# Patient Record
Sex: FEMALE | Race: WHITE | NOT HISPANIC OR LATINO | Employment: OTHER | URBAN - METROPOLITAN AREA
[De-identification: names, ages, dates, MRNs, and addresses within clinical notes are randomized per-mention and may not be internally consistent; named-entity substitution may affect disease eponyms.]

---

## 2017-05-21 ENCOUNTER — HOSPITAL ENCOUNTER (INPATIENT)
Facility: HOSPITAL | Age: 82
LOS: 5 days | Discharge: HOSPICE | DRG: 023 | End: 2017-05-26
Attending: EMERGENCY MEDICINE | Admitting: PSYCHIATRY & NEUROLOGY
Payer: MEDICARE

## 2017-05-21 DIAGNOSIS — I63.511 ACUTE ISCHEMIC RIGHT MCA STROKE: ICD-10-CM

## 2017-05-21 DIAGNOSIS — F41.9 ANXIETY: ICD-10-CM

## 2017-05-21 DIAGNOSIS — R52 PAIN: ICD-10-CM

## 2017-05-21 DIAGNOSIS — Z51.5 PALLIATIVE CARE ENCOUNTER: ICD-10-CM

## 2017-05-21 DIAGNOSIS — Z71.89 GOALS OF CARE, COUNSELING/DISCUSSION: ICD-10-CM

## 2017-05-21 DIAGNOSIS — I63.231 CEREBROVASCULAR ACCIDENT (CVA) DUE TO OCCLUSION OF RIGHT CAROTID ARTERY: ICD-10-CM

## 2017-05-21 PROBLEM — R29.810 WEAKNESS ON LEFT SIDE OF FACE: Status: RESOLVED | Noted: 2017-05-21 | Resolved: 2017-05-21

## 2017-05-21 PROBLEM — G81.04 FLACCID HEMIPLEGIA AFFECTING LEFT NONDOMINANT SIDE: Status: ACTIVE | Noted: 2017-05-21

## 2017-05-21 PROBLEM — Z92.82 RECEIVED TISSUE PLASMINOGEN ACTIVATOR (T-PA) LESS THAN 24 HOURS PRIOR TO ARRIVAL: Status: ACTIVE | Noted: 2017-05-21

## 2017-05-21 PROBLEM — R47.1 DYSARTHRIA: Status: ACTIVE | Noted: 2017-05-21

## 2017-05-21 PROBLEM — G93.6 CYTOTOXIC CEREBRAL EDEMA: Status: ACTIVE | Noted: 2017-05-21

## 2017-05-21 PROBLEM — I63.411 EMBOLIC STROKE INVOLVING RIGHT MIDDLE CEREBRAL ARTERY: Status: ACTIVE | Noted: 2017-05-21

## 2017-05-21 PROBLEM — R29.810 WEAKNESS ON LEFT SIDE OF FACE: Status: ACTIVE | Noted: 2017-05-21

## 2017-05-21 PROBLEM — H53.462 LEFT HOMONYMOUS HEMIANOPSIA: Status: ACTIVE | Noted: 2017-05-21

## 2017-05-21 LAB
ABO + RH BLD: NORMAL
ALBUMIN SERPL BCP-MCNC: 3.9 G/DL
ALLENS TEST: ABNORMAL
ALP SERPL-CCNC: 69 U/L
ALT SERPL W/O P-5'-P-CCNC: 26 U/L
ANION GAP SERPL CALC-SCNC: 11 MMOL/L
ANISOCYTOSIS BLD QL SMEAR: ABNORMAL
ANISOCYTOSIS BLD QL SMEAR: ABNORMAL
APTT BLDCRRT: 21.5 SEC
APTT BLDCRRT: <21 SEC
AST SERPL-CCNC: 42 U/L
BACTERIA #/AREA URNS AUTO: ABNORMAL /HPF
BASO STIPL BLD QL SMEAR: ABNORMAL
BASOPHILS # BLD AUTO: 0.01 K/UL
BASOPHILS # BLD AUTO: 0.02 K/UL
BASOPHILS NFR BLD: 0.2 %
BASOPHILS NFR BLD: 0.3 %
BILIRUB SERPL-MCNC: 0.9 MG/DL
BILIRUB UR QL STRIP: NEGATIVE
BLD GP AB SCN CELLS X3 SERPL QL: NORMAL
BUN SERPL-MCNC: 15 MG/DL
CALCIUM SERPL-MCNC: 8.7 MG/DL
CHLORIDE SERPL-SCNC: 109 MMOL/L
CHOLEST/HDLC SERPL: 3.3 {RATIO}
CK SERPL-CCNC: 538 U/L
CLARITY UR REFRACT.AUTO: CLEAR
CO2 SERPL-SCNC: 20 MMOL/L
COLOR UR AUTO: YELLOW
CREAT SERPL-MCNC: 1 MG/DL
DACRYOCYTES BLD QL SMEAR: ABNORMAL
DELSYS: ABNORMAL
DIFFERENTIAL METHOD: ABNORMAL
DIFFERENTIAL METHOD: ABNORMAL
EOSINOPHIL # BLD AUTO: 0 K/UL
EOSINOPHIL # BLD AUTO: 0 K/UL
EOSINOPHIL NFR BLD: 0 %
EOSINOPHIL NFR BLD: 0 %
ERYTHROCYTE [DISTWIDTH] IN BLOOD BY AUTOMATED COUNT: 21.2 %
ERYTHROCYTE [DISTWIDTH] IN BLOOD BY AUTOMATED COUNT: 21.6 %
ERYTHROCYTE [SEDIMENTATION RATE] IN BLOOD BY WESTERGREN METHOD: 16 MM/H
EST. GFR  (AFRICAN AMERICAN): >60 ML/MIN/1.73 M^2
EST. GFR  (NON AFRICAN AMERICAN): 52.6 ML/MIN/1.73 M^2
FIO2: 21
GIANT PLATELETS BLD QL SMEAR: PRESENT
GLUCOSE SERPL-MCNC: 137 MG/DL
GLUCOSE UR QL STRIP: ABNORMAL
HCO3 UR-SCNC: 18.3 MMOL/L (ref 24–28)
HCT VFR BLD AUTO: 25.5 %
HCT VFR BLD AUTO: 25.5 %
HCT VFR BLD AUTO: 26.3 %
HCT VFR BLD AUTO: 30.8 %
HDL/CHOLESTEROL RATIO: 29.9 %
HDLC SERPL-MCNC: 187 MG/DL
HDLC SERPL-MCNC: 56 MG/DL
HGB BLD-MCNC: 7.8 G/DL
HGB BLD-MCNC: 7.8 G/DL
HGB BLD-MCNC: 7.9 G/DL
HGB BLD-MCNC: 9.3 G/DL
HGB UR QL STRIP: ABNORMAL
HYPOCHROMIA BLD QL SMEAR: ABNORMAL
HYPOCHROMIA BLD QL SMEAR: ABNORMAL
INR PPP: 1
INR PPP: 1.1
KETONES UR QL STRIP: ABNORMAL
LDLC SERPL CALC-MCNC: 117 MG/DL
LEUKOCYTE ESTERASE UR QL STRIP: NEGATIVE
LYMPHOCYTES # BLD AUTO: 0.4 K/UL
LYMPHOCYTES # BLD AUTO: 0.5 K/UL
LYMPHOCYTES NFR BLD: 6.3 %
LYMPHOCYTES NFR BLD: 8.1 %
MAGNESIUM SERPL-MCNC: 2 MG/DL
MCH RBC QN AUTO: 22.5 PG
MCH RBC QN AUTO: 22.7 PG
MCHC RBC AUTO-ENTMCNC: 30.2 %
MCHC RBC AUTO-ENTMCNC: 30.6 %
MCV RBC AUTO: 74 FL
MCV RBC AUTO: 75 FL
MICROSCOPIC COMMENT: ABNORMAL
MODE: ABNORMAL
MONOCYTES # BLD AUTO: 0.3 K/UL
MONOCYTES # BLD AUTO: 0.9 K/UL
MONOCYTES NFR BLD: 13.1 %
MONOCYTES NFR BLD: 5.5 %
NEUTROPHILS # BLD AUTO: 5.1 K/UL
NEUTROPHILS # BLD AUTO: 5.3 K/UL
NEUTROPHILS NFR BLD: 80.3 %
NEUTROPHILS NFR BLD: 86.2 %
NITRITE UR QL STRIP: NEGATIVE
NONHDLC SERPL-MCNC: 131 MG/DL
OVALOCYTES BLD QL SMEAR: ABNORMAL
OVALOCYTES BLD QL SMEAR: ABNORMAL
PCO2 BLDA: 27.2 MMHG (ref 35–45)
PH SMN: 7.44 [PH] (ref 7.35–7.45)
PH UR STRIP: 5 [PH] (ref 5–8)
PHOSPHATE SERPL-MCNC: 3.3 MG/DL
PLATELET # BLD AUTO: 201 K/UL
PLATELET # BLD AUTO: 248 K/UL
PLATELET BLD QL SMEAR: ABNORMAL
PLATELET BLD QL SMEAR: ABNORMAL
PMV BLD AUTO: 10.2 FL
PMV BLD AUTO: 10.7 FL
PO2 BLDA: 75 MMHG (ref 80–100)
POC BE: -6 MMOL/L
POC SATURATED O2: 96 % (ref 95–100)
POC TCO2: 19 MMOL/L (ref 23–27)
POIKILOCYTOSIS BLD QL SMEAR: SLIGHT
POIKILOCYTOSIS BLD QL SMEAR: SLIGHT
POLYCHROMASIA BLD QL SMEAR: ABNORMAL
POTASSIUM SERPL-SCNC: 3.7 MMOL/L
PROT SERPL-MCNC: 6.7 G/DL
PROT UR QL STRIP: NEGATIVE
PROTHROMBIN TIME: 11.1 SEC
PROTHROMBIN TIME: 11.4 SEC
RBC # BLD AUTO: 3.43 M/UL
RBC # BLD AUTO: 4.13 M/UL
RBC #/AREA URNS AUTO: >100 /HPF (ref 0–4)
SAMPLE: ABNORMAL
SITE: ABNORMAL
SODIUM SERPL-SCNC: 140 MMOL/L
SP GR UR STRIP: >1.03 (ref 1–1.03)
SP02: 97
SQUAMOUS #/AREA URNS AUTO: 1 /HPF
TRIGL SERPL-MCNC: 70 MG/DL
TROPONIN I SERPL DL<=0.01 NG/ML-MCNC: 0.71 NG/ML
TROPONIN I SERPL DL<=0.01 NG/ML-MCNC: 2.77 NG/ML
TSH SERPL DL<=0.005 MIU/L-ACNC: 1.41 UIU/ML
URN SPEC COLLECT METH UR: ABNORMAL
UROBILINOGEN UR STRIP-ACNC: NEGATIVE EU/DL
WBC # BLD AUTO: 5.96 K/UL
WBC # BLD AUTO: 6.62 K/UL

## 2017-05-21 PROCEDURE — 85018 HEMOGLOBIN: CPT

## 2017-05-21 PROCEDURE — 99285 EMERGENCY DEPT VISIT HI MDM: CPT | Mod: ,,, | Performed by: EMERGENCY MEDICINE

## 2017-05-21 PROCEDURE — 86900 BLOOD TYPING SEROLOGIC ABO: CPT

## 2017-05-21 PROCEDURE — 85730 THROMBOPLASTIN TIME PARTIAL: CPT | Mod: 91

## 2017-05-21 PROCEDURE — 25000003 PHARM REV CODE 250: Performed by: STUDENT IN AN ORGANIZED HEALTH CARE EDUCATION/TRAINING PROGRAM

## 2017-05-21 PROCEDURE — 25000003 PHARM REV CODE 250: Performed by: PHYSICIAN ASSISTANT

## 2017-05-21 PROCEDURE — 82803 BLOOD GASES ANY COMBINATION: CPT

## 2017-05-21 PROCEDURE — 99291 CRITICAL CARE FIRST HOUR: CPT | Mod: 25,,, | Performed by: PSYCHIATRY & NEUROLOGY

## 2017-05-21 PROCEDURE — 25000003 PHARM REV CODE 250: Performed by: NURSE PRACTITIONER

## 2017-05-21 PROCEDURE — 63600175 PHARM REV CODE 636 W HCPCS: Performed by: RADIOLOGY

## 2017-05-21 PROCEDURE — 96374 THER/PROPH/DIAG INJ IV PUSH: CPT

## 2017-05-21 PROCEDURE — 80061 LIPID PANEL: CPT

## 2017-05-21 PROCEDURE — 84443 ASSAY THYROID STIM HORMONE: CPT

## 2017-05-21 PROCEDURE — 25000003 PHARM REV CODE 250

## 2017-05-21 PROCEDURE — 85014 HEMATOCRIT: CPT

## 2017-05-21 PROCEDURE — 25000003 PHARM REV CODE 250: Performed by: PSYCHIATRY & NEUROLOGY

## 2017-05-21 PROCEDURE — 80053 COMPREHEN METABOLIC PANEL: CPT

## 2017-05-21 PROCEDURE — 84484 ASSAY OF TROPONIN QUANT: CPT

## 2017-05-21 PROCEDURE — 25500020 PHARM REV CODE 255: Performed by: EMERGENCY MEDICINE

## 2017-05-21 PROCEDURE — 99223 1ST HOSP IP/OBS HIGH 75: CPT | Mod: GC,,, | Performed by: NEUROLOGICAL SURGERY

## 2017-05-21 PROCEDURE — 86850 RBC ANTIBODY SCREEN: CPT

## 2017-05-21 PROCEDURE — 81001 URINALYSIS AUTO W/SCOPE: CPT

## 2017-05-21 PROCEDURE — 83036 HEMOGLOBIN GLYCOSYLATED A1C: CPT

## 2017-05-21 PROCEDURE — 20000000 HC ICU ROOM

## 2017-05-21 PROCEDURE — B3161ZZ FLUOROSCOPY OF RIGHT INTERNAL CAROTID ARTERY USING LOW OSMOLAR CONTRAST: ICD-10-PCS | Performed by: RADIOLOGY

## 2017-05-21 PROCEDURE — 84484 ASSAY OF TROPONIN QUANT: CPT | Mod: 91

## 2017-05-21 PROCEDURE — 99223 1ST HOSP IP/OBS HIGH 75: CPT | Mod: ,,, | Performed by: PHYSICIAN ASSISTANT

## 2017-05-21 PROCEDURE — 85610 PROTHROMBIN TIME: CPT | Mod: 91

## 2017-05-21 PROCEDURE — 85610 PROTHROMBIN TIME: CPT

## 2017-05-21 PROCEDURE — 85730 THROMBOPLASTIN TIME PARTIAL: CPT

## 2017-05-21 PROCEDURE — 93005 ELECTROCARDIOGRAM TRACING: CPT

## 2017-05-21 PROCEDURE — 85384 FIBRINOGEN ACTIVITY: CPT

## 2017-05-21 PROCEDURE — 36600 WITHDRAWAL OF ARTERIAL BLOOD: CPT

## 2017-05-21 PROCEDURE — 85025 COMPLETE CBC W/AUTO DIFF WBC: CPT

## 2017-05-21 PROCEDURE — 96376 TX/PRO/DX INJ SAME DRUG ADON: CPT

## 2017-05-21 PROCEDURE — 36620 INSERTION CATHETER ARTERY: CPT | Mod: ,,, | Performed by: PHYSICIAN ASSISTANT

## 2017-05-21 PROCEDURE — 99900035 HC TECH TIME PER 15 MIN (STAT)

## 2017-05-21 PROCEDURE — 82550 ASSAY OF CK (CPK): CPT

## 2017-05-21 PROCEDURE — 93010 ELECTROCARDIOGRAM REPORT: CPT | Mod: ,,, | Performed by: INTERNAL MEDICINE

## 2017-05-21 PROCEDURE — 83735 ASSAY OF MAGNESIUM: CPT

## 2017-05-21 PROCEDURE — 03CK3ZZ EXTIRPATION OF MATTER FROM RIGHT INTERNAL CAROTID ARTERY, PERCUTANEOUS APPROACH: ICD-10-PCS | Performed by: RADIOLOGY

## 2017-05-21 PROCEDURE — 63600175 PHARM REV CODE 636 W HCPCS: Performed by: STUDENT IN AN ORGANIZED HEALTH CARE EDUCATION/TRAINING PROGRAM

## 2017-05-21 PROCEDURE — 86920 COMPATIBILITY TEST SPIN: CPT

## 2017-05-21 PROCEDURE — B3131ZZ FLUOROSCOPY OF RIGHT COMMON CAROTID ARTERY USING LOW OSMOLAR CONTRAST: ICD-10-PCS | Performed by: RADIOLOGY

## 2017-05-21 PROCEDURE — 80307 DRUG TEST PRSMV CHEM ANLYZR: CPT

## 2017-05-21 PROCEDURE — 25000003 PHARM REV CODE 250: Performed by: RADIOLOGY

## 2017-05-21 PROCEDURE — 84100 ASSAY OF PHOSPHORUS: CPT

## 2017-05-21 PROCEDURE — 99285 EMERGENCY DEPT VISIT HI MDM: CPT | Mod: 25

## 2017-05-21 RX ORDER — FENTANYL CITRATE 50 UG/ML
INJECTION, SOLUTION INTRAMUSCULAR; INTRAVENOUS CODE/TRAUMA/SEDATION MEDICATION
Status: COMPLETED | OUTPATIENT
Start: 2017-05-21 | End: 2017-05-21

## 2017-05-21 RX ORDER — POTASSIUM CHLORIDE 7.45 MG/ML
10 INJECTION INTRAVENOUS
Status: DISCONTINUED | OUTPATIENT
Start: 2017-05-21 | End: 2017-05-25

## 2017-05-21 RX ORDER — AMOXICILLIN 250 MG
1 CAPSULE ORAL 2 TIMES DAILY
Status: DISCONTINUED | OUTPATIENT
Start: 2017-05-21 | End: 2017-05-22

## 2017-05-21 RX ORDER — IODIXANOL 320 MG/ML
200 INJECTION, SOLUTION INTRAVASCULAR
Status: COMPLETED | OUTPATIENT
Start: 2017-05-21 | End: 2017-05-21

## 2017-05-21 RX ORDER — LIDOCAINE HYDROCHLORIDE 10 MG/ML
INJECTION INFILTRATION; PERINEURAL
Status: COMPLETED
Start: 2017-05-21 | End: 2017-05-21

## 2017-05-21 RX ORDER — GLUCAGON 1 MG
1 KIT INJECTION
Status: DISCONTINUED | OUTPATIENT
Start: 2017-05-21 | End: 2017-05-25

## 2017-05-21 RX ORDER — SODIUM CHLORIDE 0.9 % (FLUSH) 0.9 %
3 SYRINGE (ML) INJECTION EVERY 8 HOURS
Status: DISCONTINUED | OUTPATIENT
Start: 2017-05-21 | End: 2017-05-25

## 2017-05-21 RX ORDER — HYDRALAZINE HYDROCHLORIDE 20 MG/ML
10 INJECTION INTRAMUSCULAR; INTRAVENOUS EVERY 4 HOURS PRN
Status: DISCONTINUED | OUTPATIENT
Start: 2017-05-21 | End: 2017-05-24

## 2017-05-21 RX ORDER — ATORVASTATIN CALCIUM 20 MG/1
40 TABLET, FILM COATED ORAL DAILY
Status: DISCONTINUED | OUTPATIENT
Start: 2017-05-22 | End: 2017-05-22

## 2017-05-21 RX ORDER — INSULIN ASPART 100 [IU]/ML
0-5 INJECTION, SOLUTION INTRAVENOUS; SUBCUTANEOUS EVERY 6 HOURS PRN
Status: DISCONTINUED | OUTPATIENT
Start: 2017-05-21 | End: 2017-05-25

## 2017-05-21 RX ORDER — ONDANSETRON 2 MG/ML
4 INJECTION INTRAMUSCULAR; INTRAVENOUS EVERY 12 HOURS PRN
Status: DISCONTINUED | OUTPATIENT
Start: 2017-05-21 | End: 2017-05-26 | Stop reason: HOSPADM

## 2017-05-21 RX ORDER — MIDAZOLAM HYDROCHLORIDE 1 MG/ML
INJECTION, SOLUTION INTRAMUSCULAR; INTRAVENOUS CODE/TRAUMA/SEDATION MEDICATION
Status: COMPLETED | OUTPATIENT
Start: 2017-05-21 | End: 2017-05-21

## 2017-05-21 RX ORDER — ACETAMINOPHEN 325 MG/1
650 TABLET ORAL EVERY 6 HOURS PRN
Status: DISCONTINUED | OUTPATIENT
Start: 2017-05-21 | End: 2017-05-22

## 2017-05-21 RX ORDER — LIDOCAINE HYDROCHLORIDE 10 MG/ML
1 INJECTION, SOLUTION EPIDURAL; INFILTRATION; INTRACAUDAL; PERINEURAL ONCE
Status: COMPLETED | OUTPATIENT
Start: 2017-05-21 | End: 2017-05-21

## 2017-05-21 RX ORDER — MAGNESIUM SULFATE HEPTAHYDRATE 40 MG/ML
2 INJECTION, SOLUTION INTRAVENOUS
Status: DISCONTINUED | OUTPATIENT
Start: 2017-05-21 | End: 2017-05-25

## 2017-05-21 RX ORDER — MAGNESIUM SULFATE HEPTAHYDRATE 40 MG/ML
4 INJECTION, SOLUTION INTRAVENOUS
Status: DISCONTINUED | OUTPATIENT
Start: 2017-05-21 | End: 2017-05-25

## 2017-05-21 RX ORDER — PANTOPRAZOLE SODIUM 40 MG/1
40 TABLET, DELAYED RELEASE ORAL DAILY
COMMUNITY

## 2017-05-21 RX ORDER — SODIUM CHLORIDE 9 MG/ML
INJECTION, SOLUTION INTRAVENOUS CONTINUOUS
Status: DISCONTINUED | OUTPATIENT
Start: 2017-05-21 | End: 2017-05-25

## 2017-05-21 RX ORDER — NICARDIPINE HYDROCHLORIDE 0.2 MG/ML
5 INJECTION INTRAVENOUS CONTINUOUS
Status: DISCONTINUED | OUTPATIENT
Start: 2017-05-21 | End: 2017-05-22

## 2017-05-21 RX ADMIN — SODIUM CHLORIDE: 0.9 INJECTION, SOLUTION INTRAVENOUS at 11:05

## 2017-05-21 RX ADMIN — SODIUM CHLORIDE, PRESERVATIVE FREE 3 ML: 5 INJECTION INTRAVENOUS at 11:05

## 2017-05-21 RX ADMIN — LIDOCAINE HYDROCHLORIDE 10 MG: 10 INJECTION, SOLUTION EPIDURAL; INFILTRATION; INTRACAUDAL; PERINEURAL at 10:05

## 2017-05-21 RX ADMIN — MIDAZOLAM HYDROCHLORIDE 1 MG: 1 INJECTION, SOLUTION INTRAMUSCULAR; INTRAVENOUS at 05:05

## 2017-05-21 RX ADMIN — NICARDIPINE HYDROCHLORIDE 5 MG/HR: 0.2 INJECTION, SOLUTION INTRAVENOUS at 06:05

## 2017-05-21 RX ADMIN — FENTANYL CITRATE 25 MCG: 50 INJECTION, SOLUTION INTRAMUSCULAR; INTRAVENOUS at 04:05

## 2017-05-21 RX ADMIN — LIDOCAINE HYDROCHLORIDE: 10 INJECTION, SOLUTION INFILTRATION; PERINEURAL at 10:05

## 2017-05-21 RX ADMIN — IODIXANOL 80 ML: 320 INJECTION, SOLUTION INTRAVASCULAR at 05:05

## 2017-05-21 RX ADMIN — MIDAZOLAM HYDROCHLORIDE 1 MG: 1 INJECTION, SOLUTION INTRAMUSCULAR; INTRAVENOUS at 04:05

## 2017-05-21 RX ADMIN — FENTANYL CITRATE 25 MCG: 50 INJECTION, SOLUTION INTRAMUSCULAR; INTRAVENOUS at 05:05

## 2017-05-21 RX ADMIN — HYDRALAZINE HYDROCHLORIDE 10 MG: 20 INJECTION INTRAMUSCULAR; INTRAVENOUS at 09:05

## 2017-05-21 RX ADMIN — MIDAZOLAM HYDROCHLORIDE 0.5 MG: 1 INJECTION, SOLUTION INTRAMUSCULAR; INTRAVENOUS at 04:05

## 2017-05-21 RX ADMIN — MIDAZOLAM HYDROCHLORIDE 0.5 MG: 1 INJECTION, SOLUTION INTRAMUSCULAR; INTRAVENOUS at 05:05

## 2017-05-21 RX ADMIN — SODIUM CHLORIDE 500 ML: 0.9 INJECTION, SOLUTION INTRAVENOUS at 10:05

## 2017-05-21 RX ADMIN — NICARDIPINE HYDROCHLORIDE 2.5 MG/HR: 0.2 INJECTION, SOLUTION INTRAVENOUS at 06:05

## 2017-05-21 NOTE — PLAN OF CARE
Problem: Patient Care Overview  Goal: Individualization & Mutuality  Outcome: Ongoing (interventions implemented as appropriate)  Past Medical History:   Diagnosis Date    Hiatal hernia     Polymyalgia rheumatica      Past Surgical History:   Procedure Laterality Date    BACK SURGERY      CARPAL TUNNEL RELEASE Bilateral     HYSTERECTOMY      TOTAL KNEE ARTHROPLASTY Bilateral      Admitted: 5/51/17  Ischemic Stroke, R MCA    Pt likes lights dimmed

## 2017-05-21 NOTE — ED PROVIDER NOTES
Encounter Date: 5/21/2017    SCRIBE #1 NOTE: Blas OWENS am scribing for, and in the presence of, Dr. Arreaga.       History     Chief Complaint   Patient presents with    Cerebrovascular Accident     Pt presented to the ED via EMS. Pt is a transfer from [facility] for further evalaution. Pt was given TPA.      Review of patient's allergies indicates:  Allergies not on file  Time seen by provider: 2:44 PM    This is a 82 y.o. female with no currently known pertinent PMHx who presents to the ED as a transfer from [facility] s/p sudden onset of complete left sided weakness (stroke scale 9) that onset this morning. Per EMS the pt was already in the ICU for apparent GI/ulcer complaints, pt went to the bathroom around 8:20 am and had sudden onset of the above symptoms. Pt was deemed a candidate for tPA and it was administered prior to transfer here. Upon arrival pt is still reported to have a stroke scale of 9 and pt's left side is flaccid. There are no other associated symptoms or mitigating/aggravating factors reported at this time.           No past medical history on file.  No past surgical history on file.  No family history on file.  Social History   Substance Use Topics    Smoking status: Not on file    Smokeless tobacco: Not on file    Alcohol use Not on file     Review of Systems   Unable to perform ROS: Acuity of condition   Neurological: Positive for weakness (left sided).       Physical Exam     Initial Vitals [05/21/17 1442]   BP Pulse Resp Temp SpO2   (!) 170/92 97 17 97.9 °F (36.6 °C) 97 %     Physical Exam    Nursing note and vitals reviewed.  Constitutional: She appears well-developed and well-nourished.   HENT:   Head: Normocephalic.   Mouth/Throat: Oropharynx is clear and moist.   Eyes: Conjunctivae are normal.   Neck: Neck supple.   Cardiovascular: Normal rate, regular rhythm and intact distal pulses.   Pulmonary/Chest: Breath sounds normal.   Abdominal: Soft. There is no  tenderness.   Musculoskeletal: Normal range of motion.   Neurological:   Dense left hemiplegia. Pt able to follow commands on the right side only.    Skin: Skin is warm and dry.         ED Course   Procedures  Labs Reviewed   CBC W/ AUTO DIFFERENTIAL - Abnormal; Notable for the following:        Result Value    Hemoglobin 9.3 (*)     Hematocrit 30.8 (*)     MCV 75 (*)     MCH 22.5 (*)     MCHC 30.2 (*)     RDW 21.2 (*)     Lymph # 0.4 (*)     Gran% 80.3 (*)     Lymph% 6.3 (*)     All other components within normal limits   COMPREHENSIVE METABOLIC PANEL - Abnormal; Notable for the following:     CO2 20 (*)     Glucose 137 (*)     AST 42 (*)     eGFR if non  52.6 (*)     All other components within normal limits   PROTIME-INR   TSH   LIPID PANEL   LIPID PANEL   HEMOGLOBIN A1C   TSH   TROPONIN I   CK-MB   APTT   PROTIME-INR   URINALYSIS   DRUGS OF ABUSE SCREEN, BLOOD   POCT GLUCOSE   TYPE AND SCREEN PREOP   TYPE & SCREEN   POCT GLUCOSE MONITORING CONTINUOUS     Medical Decision Making:    I reviewed and interpreted the ECG and any monitoring strips.  I reviewed radiology personally along with interpretations.  I reviewed and interpreted the laboratory results.    Teodoro Arreaga MD, ANA, CPE, FACEP  Department of Emergency Medicine  , [facility]        EKG Readings: (Independently Interpreted)   Heart Rate: 111.   Sinus tachycardia, no obvious ST elevation,        X-Rays:   Independently Interpreted Readings:   Chest X-Ray: no acute process    Head CT: no acute process      Medical Decision Making:   History:   Old Medical Records: I decided to obtain old medical records.  Initial Assessment:   This is a 82 y.o. female who presents as a transfer from [facility] s/p apparent CVA. Pt's TSH and INR were normal. CMP showed no critical findings, CBC showed mild anemia with hemoglobin of 9.3 but no previous labs were immediately available  for comparison. Neuro ICU and stroke neurology were consulted emergently. Plan now is for intervention so pt will be admitted to the Neuro ICU in critical condition.   Independently Interpreted Test(s):   I have ordered and independently interpreted X-rays - see prior notes.  I have ordered and independently interpreted EKG Reading(s) - see prior notes  Clinical Tests:   Lab Tests: Ordered and Reviewed  Radiological Study: Ordered and Reviewed  Medical Tests: Ordered and Reviewed            Scribe Attestation:   Scribe #1: I performed the above scribed service and the documentation accurately describes the services I performed. I attest to the accuracy of the note.    Attending Attestation:           Physician Attestation for Scribe:  Physician Attestation Statement for Scribe #1: I, Dr. Arreaga, reviewed documentation, as scribed by Blas Cutler in my presence, and it is both accurate and complete.         Critical Care Time    Critical care time was provided for 35 minutes exclusive of other billable procedures and teaching time for the support of the neurologic organ system where the potential for death, shock, or further decline was possible.  Critical care time can include documentation, discussion with consultants, developing a care plan, as well as direct patient care.    Noel Arreaga MD             ED Course     Clinical Impression:   The encounter diagnosis was Acute ischemic right MCA stroke.    Disposition:   Disposition: Admitted  Condition: Critical      Teodoro Arreaga MD, ANA, CPE, FACEP  Department of Emergency Medicine  , [facility] [employer] [facility]       Noel Arreaga MD  05/21/17 6885

## 2017-05-21 NOTE — ASSESSMENT & PLAN NOTE
-- s/p tPA - started @ 1005 5/21  -- taken to IR for thrombectomy  -- admit to NCC  -- SBP goal <180 - if thrombectomy successful, then goal is <140  -- q1h neuro checks  -- Echo pending  -- MRI without contrast tomorrow AM  -- hold antiplatelets and DVT ppx  -- PT/OT/SLP  -- Vascular neurology following

## 2017-05-21 NOTE — SUBJECTIVE & OBJECTIVE
No past medical history on file.  No past surgical history on file.  No family history on file.  Social History   Substance Use Topics    Smoking status: Not on file    Smokeless tobacco: Not on file    Alcohol use Not on file     Review of patient's allergies indicates:  Allergies not on file  Medications: I have reviewed the current medication administration record.      (Not in a hospital admission)    Review of Systems   Constitutional: Negative for fever.   HENT: Negative for drooling.    Eyes: Negative for photophobia.   Respiratory: Negative for cough and shortness of breath.    Cardiovascular: Negative for chest pain and palpitations.   Gastrointestinal: Negative for nausea and vomiting.   Neurological: Positive for facial asymmetry and weakness.   Psychiatric/Behavioral: Negative for agitation and behavioral problems.     Objective:     Vital Signs (Most Recent):  Temp: 97.9 °F (36.6 °C) (05/21/17 1442)  Pulse: 97 (05/21/17 1442)  Resp: 17 (05/21/17 1442)  BP: (!) 170/92 (05/21/17 1442)  SpO2: 97 % (05/21/17 1442)    Vital Signs Range (Last 24H):  Temp:  [97.9 °F (36.6 °C)]   Pulse:  [97]   Resp:  [17]   BP: (170)/(92)   SpO2:  [97 %]     Physical Exam   Constitutional: She appears well-developed and well-nourished. No distress.   HENT:   Head: Normocephalic and atraumatic.   Eyes: Pupils are equal, round, and reactive to light.   Cardiovascular: Normal rate.    Pulmonary/Chest: Effort normal. No respiratory distress.   Abdominal: Soft.   Neurological: She is alert.   Skin: Skin is warm and dry.   Psychiatric: She has a normal mood and affect. Her behavior is normal.   Vitals reviewed.      Neurological Exam:   LOC: drowsy and follow some commands  Language: No aphasia  Speech: Dysarthria  Orientation: Person, Place, Time  Memory: Recent memory intact, Remote memory intact, Abnormalities: No age correct and No month correct  Visual Fields (CN II): Hemianopsia  left  EOM (CN III, IV, VI): Gaze  preference right  Pupils (CN III, IV, VI): PERRL  Facial Sensation (CN V): Symmetric  Facial Movement (CN VII): lower weakness left lower  Hearing (CN VIII): intact bilaterally  Motor*: Arm Left:  Plegic (0/5), Leg Left:   Paretic:  1/5, Arm Right:   Normal (5/5), Leg Right:   Normal (5/5)  Cerebellar*: Not testable due to trouble following commands  Sensation: intact to light touch, temperature and vibration  Tone: Arm-Left: flaccid; Leg-Left: normal; Arm-Right: normal; Leg-Right: normal    NIH Stroke Scale:    Level of Consciousness: 1 - drowsy  LOC Questions: 0 - answers both correctly  LOC Commands: 1 - performs one correctly  Best Gaze: 2 - forced deviation  Visual: 2 - complete hemianopia  Facial Palsy: 1 - minor  Motor Left Arm: 4 - no movement  Motor Right Arm: 0 - no drift  Motor Left Leg: 3 - no effort against gravity  Motor Right Le - no drift  Limb Ataxia: 0 - absent  Sensory: 0 - normal  Best Language: 0 - no aphasia  Dysarthria: 2 - near to unintelligible  Extinction and Inattention: 0 - no neglect  NIH Stroke Scale Total: 16      Laboratory:  CMP: No results for input(s): GLUCOSE, CALCIUM, ALBUMIN, PROT, NA, K, CO2, CL, BUN, CREATININE, ALKPHOS, ALT, AST, BILITOT in the last 24 hours.  CBC: No results for input(s): WBC, RBC, HGB, HCT, PLT, MCV, MCH, MCHC in the last 168 hours.  Lipid Panel: No results for input(s): CHOL, LDLCALC, HDL, TRIG in the last 168 hours.  Coagulation: No results for input(s): INR, APTT in the last 168 hours.    Invalid input(s): PT  Platelet Aggregation Study: No results for input(s): PLTAGG, PLTAGINTERP, PLTAGREGLACO, ADPPLTAGGREG in the last 168 hours.  Hgb A1C: No results for input(s): HGBA1C in the last 168 hours.  TSH: No results for input(s): TSH in the last 168 hours.    Diagnostic Results:  Brain Imaging: CT Head. Date: 17      Cerebrovascular Imaging: CTA Head. Date: 05/21/17  Occlusion of R supraclinoid carotid artery with nonvisualization of the M1  segment, possibly on the basis of intraluminal thrombus. R M2 segment is in part reconstituted via lenticulostriate collaterals. Dominant supply of the R BRIDGET territory via the a comm

## 2017-05-21 NOTE — SUBJECTIVE & OBJECTIVE
No past medical history on file.  No past surgical history on file.   No current facility-administered medications on file prior to encounter.      No current outpatient prescriptions on file prior to encounter.      Allergies: Codeine; Darvon [propoxyphene]; Macrodantin [nitrofurantoin macrocrystal]; Penicillins; and Talwin [pentazocine lactate]    No family history on file.  Social History   Substance Use Topics    Smoking status: Not on file    Smokeless tobacco: Not on file    Alcohol use Not on file     Review of Systems   Constitutional: Negative for chills and fever.   Eyes: Positive for visual disturbance.   Respiratory: Negative for shortness of breath.    Cardiovascular: Negative for chest pain.   Gastrointestinal: Negative for abdominal pain, diarrhea and nausea.   Genitourinary: Negative for dysuria.   Musculoskeletal: Negative for arthralgias and myalgias.   Skin: Negative for rash.   Neurological: Positive for speech difficulty, weakness and numbness. Negative for headaches.   Psychiatric/Behavioral: Negative for agitation.     Objective:     Vitals:  Temp: 97.9 °F (36.6 °C) (05/21/17 1442)  Pulse: (!) 113 (05/21/17 1505)  Resp: 20 (05/21/17 1505)  BP: (!) 163/70 (05/21/17 1505)  SpO2: 97 % (05/21/17 1505)    Temp:  [97.9 °F (36.6 °C)] 97.9 °F (36.6 °C)  Pulse:  [] 113  Resp:  [17-20] 20  SpO2:  [97 %] 97 %  BP: (163-170)/(70-92) 163/70        No intake/output data recorded.    Physical Exam    GA: Alert, comfortable, no acute distress.   HEENT: No scleral icterus or JVD.   Pulmonary: Clear to auscultation A/L. No wheezing, crackles, or rhonchi.  Cardiac: RRR S1 & S2 w/o rubs/murmurs/gallops.   Abdominal: Bowel sounds present x 4. No appreciable hepatosplenomegaly.  Skin: No jaundice, rashes, or visible lesions.    Neuro:  --sedation: none  --GCS: E4V5M6  --Mental Status: awake and alert. Follows commands.    --CN II-XII: left lower facial weakness. Right gaze preference, unable to look fully  left though can cross midline. Left hemianopsia  --Pupils 3mm, PERRL.   --brainstem: deferred  --Motor: 1/5 LUE, 4/5 LLE; 4+/5 RUE, 4+/5 RLE (strength exam limited by full participation)  --sensory: left hemihypoesthesia, neglect  --Gait: deferred       [place] Coma Scale    Today I personally reviewed pertinent imaging, lab results,

## 2017-05-21 NOTE — CONSULTS
[facility]  Vascular Neurology  Comprehensive Stroke Center  Consult Note      Inpatient consult to Vascular (Stroke) Neurology  Consult performed by: AZEB PATRICIA  Consult ordered by: ANGEL WOO        Assessment/Plan:     Patient is a 82 y.o. year old female with:    * Embolic stroke involving right middle cerebral artery    Priya Knapp is a 82 y.o. female with R MCA treated with tPA and thrombectomy    Hold antiplatelets until 24 hours post tPA  Atorvastatin 40  SBP <180 post tPA  PT/OT and speech to evaluate and treat  VTE ppx with SCDs, start sq heparin 24 hours post tPA  Neuro checks q1h                                      Cytotoxic cerebral edema    2/2 infarct  Evident on imaging        Left homonymous hemianopsia    2/2 stroke        Weakness on left side of face    2/2 stroke        Dysarthria    2/2 stroke  Aggressive speech therapy        Flaccid hemiplegia affecting left nondominant side    2/2 stroke  Aggressive PT and OT            Thrombolysis Candidate? Yes. Administered at OSH    Interventional Revascularization Candidate?  Yes    Research Candidate? No    Subjective:     History of Present Illness:  No notes on file         No past medical history on file.  No past surgical history on file.  No family history on file.  Social History   Substance Use Topics    Smoking status: Not on file    Smokeless tobacco: Not on file    Alcohol use Not on file     Review of patient's allergies indicates:  Allergies not on file  Medications: I have reviewed the current medication administration record.      (Not in a hospital admission)    Review of Systems   Constitutional: Negative for fever.   HENT: Negative for drooling.    Eyes: Negative for photophobia.   Respiratory: Negative for cough and shortness of breath.    Cardiovascular: Negative for chest pain and palpitations.   Gastrointestinal: Negative for nausea and vomiting.   Neurological: Positive for facial asymmetry  and weakness.   Psychiatric/Behavioral: Negative for agitation and behavioral problems.     Objective:     Vital Signs (Most Recent):  Temp: 97.9 °F (36.6 °C) (05/21/17 1442)  Pulse: 97 (05/21/17 1442)  Resp: 17 (05/21/17 1442)  BP: (!) 170/92 (05/21/17 1442)  SpO2: 97 % (05/21/17 1442)    Vital Signs Range (Last 24H):  Temp:  [97.9 °F (36.6 °C)]   Pulse:  [97]   Resp:  [17]   BP: (170)/(92)   SpO2:  [97 %]     Physical Exam   Constitutional: She appears well-developed and well-nourished. No distress.   HENT:   Head: Normocephalic and atraumatic.   Eyes: Pupils are equal, round, and reactive to light.   Cardiovascular: Normal rate.    Pulmonary/Chest: Effort normal. No respiratory distress.   Abdominal: Soft.   Neurological: She is alert.   Skin: Skin is warm and dry.   Psychiatric: She has a normal mood and affect. Her behavior is normal.   Vitals reviewed.      Neurological Exam:   LOC: drowsy and follow some commands  Language: No aphasia  Speech: Dysarthria  Orientation: Person, Place, Time  Memory: Recent memory intact, Remote memory intact, Abnormalities: No age correct and No month correct  Visual Fields (CN II): Hemianopsia  left  EOM (CN III, IV, VI): Gaze preference right  Pupils (CN III, IV, VI): PERRL  Facial Sensation (CN V): Symmetric  Facial Movement (CN VII): lower weakness left lower  Hearing (CN VIII): intact bilaterally  Motor*: Arm Left:  Plegic (0/5), Leg Left:   Paretic:  1/5, Arm Right:   Normal (5/5), Leg Right:   Normal (5/5)  Cerebellar*: Not testable due to trouble following commands  Sensation: intact to light touch, temperature and vibration  Tone: Arm-Left: flaccid; Leg-Left: normal; Arm-Right: normal; Leg-Right: normal    NIH Stroke Scale:    Level of Consciousness: 1 - drowsy  LOC Questions: 0 - answers both correctly  LOC Commands: 1 - performs one correctly  Best Gaze: 2 - forced deviation  Visual: 2 - complete hemianopia  Facial Palsy: 1 - minor  Motor Left Arm: 4 - no  movement  Motor Right Arm: 0 - no drift  Motor Left Leg: 3 - no effort against gravity  Motor Right Le - no drift  Limb Ataxia: 0 - absent  Sensory: 0 - normal  Best Language: 0 - no aphasia  Dysarthria: 2 - near to unintelligible  Extinction and Inattention: 0 - no neglect  NIH Stroke Scale Total: 16      Laboratory:  CMP: No results for input(s): GLUCOSE, CALCIUM, ALBUMIN, PROT, NA, K, CO2, CL, BUN, CREATININE, ALKPHOS, ALT, AST, BILITOT in the last 24 hours.  CBC: No results for input(s): WBC, RBC, HGB, HCT, PLT, MCV, MCH, MCHC in the last 168 hours.  Lipid Panel: No results for input(s): CHOL, LDLCALC, HDL, TRIG in the last 168 hours.  Coagulation: No results for input(s): INR, APTT in the last 168 hours.    Invalid input(s): PT  Platelet Aggregation Study: No results for input(s): PLTAGG, PLTAGINTERP, PLTAGREGLACO, ADPPLTAGGREG in the last 168 hours.  Hgb A1C: No results for input(s): HGBA1C in the last 168 hours.  TSH: No results for input(s): TSH in the last 168 hours.    Diagnostic Results:  Brain Imaging: CT Head. Date: 17      Cerebrovascular Imaging: CTA Head. Date: 17  Occlusion of R supraclinoid carotid artery with nonvisualization of the M1 segment, possibly on the basis of intraluminal thrombus. R M2 segment is in part reconstituted via lenticulostriate collaterals. Dominant supply of the R BRIDGET territory via the a comm                Maggie Cruz PA-C  [facility] St[facility]  Department of Vascular Neurology   [facility]

## 2017-05-21 NOTE — H&P
[facility]  Neurocritical Care  History & Physical    Admit Date: 5/21/2017  Service Date: 05/21/2017  Length of Stay: 0    Subjective:     Chief Complaint: Acute ischemic right MCA stroke    History of Present Illness: Mrs. Knapp is an 82 year old woman with a history of PUD, hiatal hernia and polymyalgia rheumatica who was transferred from [facility] for management of stroke. She was admitted there for a GI bleed, however a scope showed no evidence of active bleeding. Around 8:20am on 5/21 she developed sudden onset of left sided weakness and right gaze preference. CTA at the outside facility showed a possible R ICA occlusion. She received tPA at 10:05am and was transferred here for possible thrombectomy.    Symptoms have not improved significantly since onset. CT head on arrival showed a small area of hypoattenuation. The decision was made to take her to IR for thrombectomy.     No past medical history on file.  No past surgical history on file.   No current facility-administered medications on file prior to encounter.      No current outpatient prescriptions on file prior to encounter.      Allergies: Codeine; Darvon [propoxyphene]; Macrodantin [nitrofurantoin macrocrystal]; Penicillins; and Talwin [pentazocine lactate]    No family history on file.  Social History   Substance Use Topics    Smoking status: Not on file    Smokeless tobacco: Not on file    Alcohol use Not on file     Review of Systems   Constitutional: Negative for chills and fever.   Eyes: Positive for visual disturbance.   Respiratory: Negative for shortness of breath.    Cardiovascular: Negative for chest pain.   Gastrointestinal: Negative for abdominal pain, diarrhea and nausea.   Genitourinary: Negative for dysuria.   Musculoskeletal: Negative for arthralgias and myalgias.   Skin: Negative for rash.   Neurological: Positive for speech difficulty, weakness and numbness. Negative for headaches.    Psychiatric/Behavioral: Negative for agitation.     Objective:     Vitals:  Temp: 97.9 °F (36.6 °C) (05/21/17 1442)  Pulse: (!) 113 (05/21/17 1505)  Resp: 20 (05/21/17 1505)  BP: (!) 163/70 (05/21/17 1505)  SpO2: 97 % (05/21/17 1505)    Temp:  [97.9 °F (36.6 °C)] 97.9 °F (36.6 °C)  Pulse:  [] 113  Resp:  [17-20] 20  SpO2:  [97 %] 97 %  BP: (163-170)/(70-92) 163/70        No intake/output data recorded.    Physical Exam    GA: Alert, comfortable, no acute distress.   HEENT: No scleral icterus or JVD.   Pulmonary: Clear to auscultation A/L. No wheezing, crackles, or rhonchi.  Cardiac: RRR S1 & S2 w/o rubs/murmurs/gallops.   Abdominal: Bowel sounds present x 4. No appreciable hepatosplenomegaly.  Skin: No jaundice, rashes, or visible lesions.    Neuro:  --sedation: none  --GCS: E4V5M6  --Mental Status: awake and alert. Follows commands.    --CN II-XII: left lower facial weakness. Right gaze preference, unable to look fully left though can cross midline. Left hemianopsia  --Pupils 3mm, PERRL.   --brainstem: deferred  --Motor: 1/5 LUE, 4/5 LLE; 4+/5 RUE, 4+/5 RLE (strength exam limited by full participation)  --sensory: left hemihypoesthesia, neglect  --Gait: deferred       [place] Coma Scale    Today I personally reviewed pertinent imaging, lab results,     Assessment/Plan:     Neuro   * Embolic stroke involving right middle cerebral artery    -- s/p tPA - started @ 1005 5/21  -- taken to IR for thrombectomy  -- admit to [facility]  -- SBP goal <180 - if thrombectomy successful, then goal is <140  -- q1h neuro checks  -- Echo pending  -- MRI without contrast tomorrow AM  -- hold antiplatelets and DVT ppx  -- PT/OT/SLP  -- Vascular neurology following        Cytotoxic cerebral edema    -- consistent with ischemic stroke  -- see plan as above        Dysarthria    -- due to stroke  -- SLP consult        Flaccid hemiplegia affecting left nondominant side    -- due to stroke  -- see plan as above  -- PT/OT        Other    Left homonymous hemianopsia    -- due to stroke  -- see plan as above        Weakness on left side of face    -- due to stroke  -- see plan as above            Prophylaxis:  Venous Thromboembolism: mechanical  Stress Ulcer: NA  Ventilator Pneumonia: not applicable     Activity Orders          None        Full Code    Regina Howard MD  Neurocritical Care  [facility]

## 2017-05-21 NOTE — NURSING
Patient arrived to [facility] from [facility]  by ambulance    Type of Stroke: Ischemic Right MCA     TPA start time and end time: Start time: 1005   End time: 1106    Patients current symptoms include left sided weakness    Pt arrived to room 7086 at 1820 from IR. NCC notified of patient's arrival to unit.

## 2017-05-21 NOTE — ASSESSMENT & PLAN NOTE
Priya Knapp is a 82 y.o. female with R MCA treated with tPA and thrombectomy    Hold antiplatelets until 24 hours post tPA  Atorvastatin 40  SBP <180 post tPA  PT/OT and speech to evaluate and treat  VTE ppx with SCDs, start sq heparin 24 hours post tPA  Neuro checks q1h

## 2017-05-21 NOTE — HPI
Mrs. Knapp is an 82 year old woman with a history of PUD, hiatal hernia and polymyalgia rheumatica who was transferred from [facility] for management of stroke. She was admitted there for a GI bleed, however a scope showed no evidence of active bleeding. Around 8:20am on 5/21 she developed sudden onset of left sided weakness and right gaze preference. CTA at the outside facility showed a possible R ICA occlusion. She received tPA at 10:05am and was transferred here for possible thrombectomy.    Symptoms have not improved significantly since onset. CT head on arrival showed a small area of hypoattenuation. The decision was made to take her to IR for thrombectomy.

## 2017-05-21 NOTE — PROGRESS NOTES
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 Unable to access to preform neuro assessment. Consent obtained from family.

## 2017-05-21 NOTE — H&P
Inpatient Radiology Pre-procedure Note    History of Present Illness:  Priya Knapp is a 82 y.o. female who presents for cerebral angiogram with possible mechanical thrombectomy.  Admission H&P reviewed.  No past medical history on file.  No past surgical history on file.    Review of Systems:   As documented in primary team H&P    Home Meds:   Prior to Admission medications    Medication Sig Start Date End Date Taking? Authorizing Provider   pantoprazole (PROTONIX) 40 MG tablet Take 40 mg by mouth once daily.   Yes Historical Provider, MD     Scheduled Meds:   Continuous Infusions:   PRN Meds:  Anticoagulants/Antiplatelets: no anticoagulation    Allergies:   Review of patient's allergies indicates:   Allergen Reactions    Codeine     Darvon [propoxyphene]     Macrodantin [nitrofurantoin macrocrystal]     Penicillins     Talwin [pentazocine lactate]      Sedation Hx: unable to obtain    Labs:    Recent Labs  Lab 05/21/17  1454   INR 1.0       Recent Labs  Lab 05/21/17  1454   WBC 6.62   HGB 9.3*   HCT 30.8*   MCV 75*      Recent Labs  Lab 05/21/17  1454   *      K 3.7      CO2 20*   BUN 15   CREATININE 1.0   CALCIUM 8.7   ALT 26   AST 42*   ALBUMIN 3.9   BILITOT 0.9         Vitals:  Temp: 97.9 °F (36.6 °C) (05/21/17 1442)  Pulse: (!) 113 (05/21/17 1505)  Resp: 20 (05/21/17 1505)  BP: (!) 163/70 (05/21/17 1505)  SpO2: 97 % (05/21/17 1505)     Physical Exam:  ASA: 3  Mallampati: 2    General: moderate distress  Mental Status: drowsy arousable  HEENT: normocephalic, atraumatic  Chest: unlabored breathing  Heart: regular heart rate  Abdomen: nondistended  Extremity: left sided plegic    Plan: Cerebral angiogram with possible mechanical thrombectomy for suspected right ICA terminus occlusion. Attempts were made to contact family although they were unable to be reached. Emergency consent obtained.  Sedation Plan: Moderate.    Haider Pratt MD  Department of Radiology  Pager: [phone]

## 2017-05-21 NOTE — PROCEDURES
Radiology Post-Procedure Note    Pre Op Diagnosis: Stroke    Post Op Diagnosis: Stroke    Procedure: Cerebral angiogram and Mechanical thrombectomy    Procedure performed by: Haider Pratt MD    Written Informed Consent Obtained: Yes    Specimen Removed: NO    Estimated Blood Loss: less than 100     Procedure report:     An 8F sheath was placed into the right femoral artery and a 5F David catheter and neuron max guiding catheter was advanced into the aortic arch.  The right common, internal and middle cerebral arteries were subselected and angiography of the brain was performed after injection into each of these vessels.    Preliminary interpretation: Right internal carotid angiogram demonstrated significant tortuosity of the right ICA with a right carotid terminus occlusion. Mechanical thrombectomy was performed using a combination of aspiration thrombectomy and stent retriever.      There was restoration of flow to the carotid terminus and minimal restoration of flow in the right MCA territory (TICI 1 Flow).   Please see Imaging report for full details.    I was already at the hospital when it was decided to intervene.    Groin access was at 4:04 pm.  First Pass at 4:33 pm  Second Pass at 5:12 pm   Third Pass at 5:48 pm.    A right femoral artery angiogram was performed, the sheath removed and hemostasis achieved using a perclose device.  No hematoma was present at the time of hemostasis.    The patient tolerated the procedure well.     Haider Pratt MD  Department of Radiology  Pager: [phone]

## 2017-05-21 NOTE — PROGRESS NOTES
Hemostasis achieved via right groin with use of Per Close closure device. Patient to lie flat until 8pm.

## 2017-05-21 NOTE — ED NOTES
Patient t/f from [facility] for left sided weakness noticed this morning around 0830.  Patient was an ICU admit for GI bleed and was scoped yesterday and received 2 units PRBCS.  Patient was TPA'd at 1005 today and completed TPA at 1106.

## 2017-05-22 PROBLEM — D62 ACUTE BLOOD LOSS ANEMIA: Status: ACTIVE | Noted: 2017-05-22

## 2017-05-22 PROBLEM — R79.89 ELEVATED TROPONIN: Status: ACTIVE | Noted: 2017-05-22

## 2017-05-22 LAB
ANION GAP SERPL CALC-SCNC: 13 MMOL/L
ANISOCYTOSIS BLD QL SMEAR: SLIGHT
BASOPHILS # BLD AUTO: 0.02 K/UL
BASOPHILS NFR BLD: 0.3 %
BUN SERPL-MCNC: 17 MG/DL
CALCIUM SERPL-MCNC: 8 MG/DL
CHLORIDE SERPL-SCNC: 113 MMOL/L
CO2 SERPL-SCNC: 14 MMOL/L
CREAT SERPL-MCNC: 0.9 MG/DL
DIFFERENTIAL METHOD: ABNORMAL
EOSINOPHIL # BLD AUTO: 0 K/UL
EOSINOPHIL NFR BLD: 0 %
ERYTHROCYTE [DISTWIDTH] IN BLOOD BY AUTOMATED COUNT: 22.3 %
EST. GFR  (AFRICAN AMERICAN): >60 ML/MIN/1.73 M^2
EST. GFR  (NON AFRICAN AMERICAN): 59.7 ML/MIN/1.73 M^2
ESTIMATED AVG GLUCOSE: 117 MG/DL
ESTIMATED PA SYSTOLIC PRESSURE: 44.22
FIBRINOGEN PPP-MCNC: 307 MG/DL
GLUCOSE SERPL-MCNC: 136 MG/DL
HBA1C MFR BLD HPLC: 5.7 %
HCT VFR BLD AUTO: 24.4 %
HCT VFR BLD AUTO: 24.5 %
HCT VFR BLD AUTO: 24.9 %
HGB BLD-MCNC: 7.4 G/DL
HGB BLD-MCNC: 7.5 G/DL
HGB BLD-MCNC: 7.5 G/DL
HYPOCHROMIA BLD QL SMEAR: ABNORMAL
INR PPP: 1
LYMPHOCYTES # BLD AUTO: 0.6 K/UL
LYMPHOCYTES NFR BLD: 8.5 %
MAGNESIUM SERPL-MCNC: 1.8 MG/DL
MCH RBC QN AUTO: 22.5 PG
MCHC RBC AUTO-ENTMCNC: 30.2 %
MCV RBC AUTO: 75 FL
MITRAL VALVE MOBILITY: ABNORMAL
MONOCYTES # BLD AUTO: 0.5 K/UL
MONOCYTES NFR BLD: 7.2 %
NEUTROPHILS # BLD AUTO: 6.3 K/UL
NEUTROPHILS NFR BLD: 84 %
OVALOCYTES BLD QL SMEAR: ABNORMAL
PLATELET # BLD AUTO: 204 K/UL
PLATELET BLD QL SMEAR: ABNORMAL
PMV BLD AUTO: 10.5 FL
POCT GLUCOSE: 125 MG/DL (ref 70–110)
POCT GLUCOSE: 146 MG/DL (ref 70–110)
POCT GLUCOSE: 166 MG/DL (ref 70–110)
POIKILOCYTOSIS BLD QL SMEAR: SLIGHT
POLYCHROMASIA BLD QL SMEAR: ABNORMAL
POTASSIUM SERPL-SCNC: 3.6 MMOL/L
PROTHROMBIN TIME: 11 SEC
RBC # BLD AUTO: 3.29 M/UL
RETIRED EF AND QEF - SEE NOTES: 73 (ref 55–65)
SODIUM SERPL-SCNC: 140 MMOL/L
TROPONIN I SERPL DL<=0.01 NG/ML-MCNC: 1.62 NG/ML
WBC # BLD AUTO: 7.5 K/UL

## 2017-05-22 PROCEDURE — G8996 SWALLOW CURRENT STATUS: HCPCS | Mod: CN

## 2017-05-22 PROCEDURE — 99233 SBSQ HOSP IP/OBS HIGH 50: CPT | Mod: ,,, | Performed by: PSYCHIATRY & NEUROLOGY

## 2017-05-22 PROCEDURE — G8997 SWALLOW GOAL STATUS: HCPCS | Mod: CK

## 2017-05-22 PROCEDURE — 36620 INSERTION CATHETER ARTERY: CPT

## 2017-05-22 PROCEDURE — 84484 ASSAY OF TROPONIN QUANT: CPT

## 2017-05-22 PROCEDURE — 97162 PT EVAL MOD COMPLEX 30 MIN: CPT

## 2017-05-22 PROCEDURE — 93306 TTE W/DOPPLER COMPLETE: CPT | Mod: 26,,, | Performed by: INTERNAL MEDICINE

## 2017-05-22 PROCEDURE — 97530 THERAPEUTIC ACTIVITIES: CPT

## 2017-05-22 PROCEDURE — 83735 ASSAY OF MAGNESIUM: CPT

## 2017-05-22 PROCEDURE — 36620 INSERTION CATHETER ARTERY: CPT | Mod: ,,, | Performed by: PHYSICIAN ASSISTANT

## 2017-05-22 PROCEDURE — 99233 SBSQ HOSP IP/OBS HIGH 50: CPT | Mod: GC,,, | Performed by: NEUROLOGICAL SURGERY

## 2017-05-22 PROCEDURE — 85610 PROTHROMBIN TIME: CPT

## 2017-05-22 PROCEDURE — 25000003 PHARM REV CODE 250: Performed by: RADIOLOGY

## 2017-05-22 PROCEDURE — 85025 COMPLETE CBC W/AUTO DIFF WBC: CPT

## 2017-05-22 PROCEDURE — 93306 TTE W/DOPPLER COMPLETE: CPT

## 2017-05-22 PROCEDURE — 63600175 PHARM REV CODE 636 W HCPCS: Performed by: STUDENT IN AN ORGANIZED HEALTH CARE EDUCATION/TRAINING PROGRAM

## 2017-05-22 PROCEDURE — 85014 HEMATOCRIT: CPT | Mod: 91

## 2017-05-22 PROCEDURE — C9113 INJ PANTOPRAZOLE SODIUM, VIA: HCPCS | Performed by: PHYSICIAN ASSISTANT

## 2017-05-22 PROCEDURE — 20000000 HC ICU ROOM

## 2017-05-22 PROCEDURE — 92610 EVALUATE SWALLOWING FUNCTION: CPT

## 2017-05-22 PROCEDURE — 36415 COLL VENOUS BLD VENIPUNCTURE: CPT

## 2017-05-22 PROCEDURE — 25000003 PHARM REV CODE 250: Performed by: PHYSICIAN ASSISTANT

## 2017-05-22 PROCEDURE — 63600175 PHARM REV CODE 636 W HCPCS: Performed by: PHYSICIAN ASSISTANT

## 2017-05-22 PROCEDURE — 99223 1ST HOSP IP/OBS HIGH 75: CPT | Mod: GC,,, | Performed by: INTERNAL MEDICINE

## 2017-05-22 PROCEDURE — 85018 HEMOGLOBIN: CPT | Mod: 91

## 2017-05-22 PROCEDURE — 80048 BASIC METABOLIC PNL TOTAL CA: CPT

## 2017-05-22 PROCEDURE — 99291 CRITICAL CARE FIRST HOUR: CPT | Mod: ,,, | Performed by: PSYCHIATRY & NEUROLOGY

## 2017-05-22 RX ORDER — AMOXICILLIN 250 MG
1 CAPSULE ORAL 2 TIMES DAILY
Status: DISCONTINUED | OUTPATIENT
Start: 2017-05-22 | End: 2017-05-25

## 2017-05-22 RX ORDER — ACETAMINOPHEN 325 MG/1
650 TABLET ORAL EVERY 6 HOURS PRN
Status: DISCONTINUED | OUTPATIENT
Start: 2017-05-22 | End: 2017-05-25

## 2017-05-22 RX ORDER — LISINOPRIL 20 MG/1
20 TABLET ORAL DAILY
Status: DISCONTINUED | OUTPATIENT
Start: 2017-05-22 | End: 2017-05-25

## 2017-05-22 RX ORDER — PANTOPRAZOLE SODIUM 40 MG/10ML
40 INJECTION, POWDER, LYOPHILIZED, FOR SOLUTION INTRAVENOUS 2 TIMES DAILY
Status: DISCONTINUED | OUTPATIENT
Start: 2017-05-22 | End: 2017-05-25

## 2017-05-22 RX ORDER — MIDAZOLAM HYDROCHLORIDE 1 MG/ML
1 INJECTION INTRAMUSCULAR; INTRAVENOUS EVERY 5 MIN PRN
Status: COMPLETED | OUTPATIENT
Start: 2017-05-22 | End: 2017-05-22

## 2017-05-22 RX ORDER — ATORVASTATIN CALCIUM 20 MG/1
40 TABLET, FILM COATED ORAL DAILY
Status: DISCONTINUED | OUTPATIENT
Start: 2017-05-22 | End: 2017-05-25

## 2017-05-22 RX ORDER — PANTOPRAZOLE SODIUM 40 MG/10ML
40 INJECTION, POWDER, LYOPHILIZED, FOR SOLUTION INTRAVENOUS 2 TIMES DAILY
Status: DISCONTINUED | OUTPATIENT
Start: 2017-05-22 | End: 2017-05-22

## 2017-05-22 RX ORDER — PANTOPRAZOLE SODIUM 40 MG/1
40 FOR SUSPENSION ORAL DAILY
Status: DISCONTINUED | OUTPATIENT
Start: 2017-05-22 | End: 2017-05-22

## 2017-05-22 RX ADMIN — SODIUM CHLORIDE, PRESERVATIVE FREE 3 ML: 5 INJECTION INTRAVENOUS at 02:05

## 2017-05-22 RX ADMIN — POTASSIUM CHLORIDE 10 MEQ: 10 INJECTION, SOLUTION INTRAVENOUS at 11:05

## 2017-05-22 RX ADMIN — NICARDIPINE HYDROCHLORIDE 5 MG/HR: 0.2 INJECTION, SOLUTION INTRAVENOUS at 03:05

## 2017-05-22 RX ADMIN — MAGNESIUM SULFATE HEPTAHYDRATE 2 G: 40 INJECTION, SOLUTION INTRAVENOUS at 11:05

## 2017-05-22 RX ADMIN — HYDRALAZINE HYDROCHLORIDE 10 MG: 20 INJECTION INTRAMUSCULAR; INTRAVENOUS at 07:05

## 2017-05-22 RX ADMIN — PANTOPRAZOLE SODIUM 40 MG: 40 INJECTION, POWDER, FOR SOLUTION INTRAVENOUS at 02:05

## 2017-05-22 RX ADMIN — PANTOPRAZOLE SODIUM 40 MG: 40 INJECTION, POWDER, FOR SOLUTION INTRAVENOUS at 08:05

## 2017-05-22 RX ADMIN — MIDAZOLAM HYDROCHLORIDE 1 MG: 1 INJECTION, SOLUTION INTRAMUSCULAR; INTRAVENOUS at 12:05

## 2017-05-22 NOTE — HPI
Priya Knapp is a 82 y.o. Female with a PMHx of PUD, hiatal hernai, and polymalgia rheumatica who was admitted to [facility] for a GI bleed. Patient had scope which showed no evidence of active bleeding. This morning around 0820, the patient developed R MCA syndrome and was given tPA at 1005. She was transferred here for possible thrombectomy.      CTA was completed at OSH PTA. In order to avoid contrast induced kidney injury, it was decided to repeat head CT only.

## 2017-05-22 NOTE — PROGRESS NOTES
Progress Note  Neurosurgery    Admit Date: 5/21/2017  Post-operative Day:    Hospital Day: 2    SUBJECTIVE:     Priya Knapp is a 82 y.o. female Right MCA stoke, s/p iv tPA and interventional thrombectomy.   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  Follow-up For:  * No surgery found *      Scheduled Meds:   atorvastatin  40 mg Per NG tube Daily    lisinopril  20 mg Per NG tube Daily    pantoprazole  40 mg Per NG tube Daily    senna-docusate 8.6-50 mg  1 tablet Per NG tube BID    sodium chloride 0.9%  3 mL Intravenous Q8H     Continuous Infusions:   sodium chloride 0.9% 75 mL/hr at 05/22/17 0900    niCARdipine 2.5 mg/hr (05/22/17 0900)     PRN Meds:acetaminophen, dextrose 50%, glucagon (human recombinant), hydrALAZINE, insulin aspart, magnesium sulfate IVPB, magnesium sulfate IVPB, ondansetron, potassium chloride, potassium chloride, potassium chloride, sodium phosphate IVPB, sodium phosphate IVPB, sodium phosphate IVPB    Review of patient's allergies indicates:   Allergen Reactions    Codeine     Darvon [propoxyphene]     Macrodantin [nitrofurantoin macrocrystal]     Penicillins     Pneumococcal 23-erika ps vaccine     Talwin [pentazocine lactate]        OBJECTIVE:     Vital Signs (Most Recent)  Temp: 99.5 °F (37.5 °C) (05/22/17 0701)  Pulse: (!) 112 (05/22/17 0900)  Resp: 20 (05/22/17 0900)  BP: (!) 119/56 (05/22/17 0701)  SpO2: 97 % (05/22/17 0900)    Vital Signs Range (Last 24H):  Temp:  [97.9 °F (36.6 °C)-100.4 °F (38 °C)]   Pulse:  []   Resp:  [10-46]   BP: ()/(37-92)   SpO2:  [93 %-100 %]   Arterial Line BP: (119-196)/(37-61)     I & O (Last 24H):  Intake/Output Summary (Last 24 hours) at 05/22/17 0950  Last data filed at 05/22/17 0900   Gross per 24 hour   Intake          1050.63 ml   Output              510 ml   Net           540.63 ml     Physical Exam:  PERRL  Speech dysarthric.   Opens eyes to stim, drowsy but arousable. Follows commands thumbs up R.    E2V4M6.   Left hemiparesis  ASA: 3  Mallampati: 2    Lines/Drains:       Peripheral IV - Single Lumen 05/21/17 1452 Left Forearm (Active)   Site Assessment Clean;Dry;Intact;No redness;No swelling 5/22/2017  3:03 AM   Line Status Flushed;Infusing 5/22/2017  3:03 AM   Dressing Status Clean;Dry;Intact 5/22/2017  3:03 AM   Dressing Intervention Dressing reinforced 5/22/2017  3:03 AM   Dressing Change Due 05/25/17 5/22/2017  3:03 AM   Reason Not Rotated Not due 5/22/2017  3:03 AM   Number of days: 0            Peripheral IV - Single Lumen 05/21/17 0000 Right Forearm (Active)   Site Assessment Clean;Dry;Intact;No redness;No swelling 5/22/2017  3:03 AM   Line Status Flushed;Infusing 5/22/2017  3:03 AM   Dressing Status Clean;Dry;Intact 5/22/2017  3:03 AM   Dressing Intervention Dressing reinforced 5/22/2017  3:03 AM   Dressing Change Due 05/25/17 5/22/2017  3:03 AM   Reason Not Rotated Not due 5/22/2017  3:03 AM   Number of days: 1            Peripheral IV - Single Lumen 05/21/17 0000 Right Forearm (Active)   Site Assessment Clean;Dry;Intact 5/22/2017  3:03 AM   Line Status Flushed;Saline locked 5/22/2017  3:03 AM   Dressing Status Clean;Dry;Intact 5/22/2017  3:03 AM   Dressing Intervention Dressing reinforced 5/22/2017  3:03 AM   Dressing Change Due 05/26/17 5/22/2017  3:03 AM   Reason Not Rotated Not due 5/22/2017  3:03 AM   Number of days: 1            Peripheral IV - Single Lumen 05/21/17 0000 Left Forearm (Active)   Site Assessment Clean;Dry;Intact;No redness;No swelling 5/22/2017  3:03 AM   Line Status Flushed;Saline locked 5/22/2017  3:03 AM   Dressing Status Clean;Dry;Intact 5/22/2017  3:03 AM   Dressing Intervention Dressing reinforced 5/22/2017  3:03 AM   Dressing Change Due 05/25/17 5/22/2017  3:03 AM   Reason Not Rotated Not due 5/22/2017  3:03 AM   Number of days: 1       Wound/Incision:    Laboratory:  CBC:   Recent Labs  Lab 05/21/17  2236   WBC 5.96   RBC 3.43*   HGB 7.8*  7.8*   HCT 25.5*  25.5*   PLT  201   MCV 74*   MCH 22.7*   MCHC 30.6*     BMP:   Recent Labs  Lab 05/21/17  1454   *      K 3.7      CO2 20*   BUN 15   CREATININE 1.0   CALCIUM 8.7   MG 2.0     CMP:   Recent Labs  Lab 05/21/17  1454   *   CALCIUM 8.7   ALBUMIN 3.9   PROT 6.7      K 3.7   CO2 20*      BUN 15   CREATININE 1.0   ALKPHOS 69   ALT 26   AST 42*   BILITOT 0.9     LFTs:   Recent Labs  Lab 05/21/17  1454   ALT 26   AST 42*   ALKPHOS 69   BILITOT 0.9   PROT 6.7   ALBUMIN 3.9     Coagulation:   Recent Labs  Lab 05/21/17 2236 05/22/17  0332   INR 1.1 1.0   APTT 21.5  --      Cardiac markers:   Recent Labs  Lab 05/21/17 2236   TROPONINI 2.767*     ABGs:   Recent Labs  Lab 05/21/17  2144   PH 7.436   PCO2 27.2*   PO2 75*   HCO3 18.3*   POCSATURATED 96   BE -6     Microbiology Results (last 7 days)     ** No results found for the last 168 hours. **        Specimen (12h ago through future)    None          Recent Labs  Lab 05/21/17  1826   COLORU Yellow   SPECGRAV >1.030*   PHUR 5.0   PROTEINUA Negative   BACTERIA Few*   NITRITE Negative   LEUKOCYTESUR Negative   UROBILINOGEN Negative       Diagnostic Results:  CT head stable. Clearing of contrast.     ASSESSMENT/PLAN:     Assessment: 83 y/o F R MCA stroke s/p IV tPa, attempted thrombectomy TICI 1 PSD#1    Neuro stable.   q 1 hour neuro checks  HOB >30degrees, neck midline.   Medical management per NCC/Stroke  Will follow closely

## 2017-05-22 NOTE — PLAN OF CARE
Problem: Patient Care Overview  Goal: Plan of Care Review  Outcome: Ongoing (interventions implemented as appropriate)  Pt's BP varied throughout the night, controlled by starting and stopping cardene multiple times throughout the night, notified NCCT of MAP periodically dropping to high 50s/low60s, no further orders were given, cardene currently at 2.5, NS at 75,  VS and assessment per flow sheet, patient progressing towards goals as tolerated, plan of care reviewed with Priya Knapp and family, all concerns addressed, will continue to monitor.

## 2017-05-22 NOTE — CONSULTS
Consult Note  Neurosurgery    Admit Date: 5/21/2017  LOS: 0    Code Status: Full Code     CC: Cerebrovascular accident (CVA) due to occlusion of right carotid artery    SUBJECTIVE:     History of Present Illness: 83 yo female with AIS secondary to R ICA terminus LVO s/p iv tPA at [facility] and mechanical and aspiration thrombectomy at [facility] (TICI 1) (5/21) found to have right putamenal ICH on routine post-tPA HCT. Pt reportedly had no change in exam prior to scan but per nurses is now becoming more difficult to arouse. Bleed is centered about right lentiform nucleus and external capsule. There is no significant effacement of the third ventricle or midline shift.     There are no Head CTs available from after tPA administration and before thrombectomy.    Pt is admitted to [facility] for post-tPA monitoring.    On exam, pt is paretic on the left sided but localizes briskly to pain. She follows simple commands on the right. She has a right gaze deviation that is voluntarily overcome. She opens eyes to voice. She is oriented to person only.     GCS E3V4M6.  ICHS 1.    Anticoagulants: None.  Surgeries During Stay:  -s/p: Right CFA access mechanical and apiration thrombectomy of R ICA terminus occlusion, TICI 1 (5/21).         OBJECTIVE:   Vital Signs (Most Recent):   Temp: 99.1 °F (37.3 °C) (05/21/17 1901)  Pulse: (!) 112 (05/21/17 2115)  Resp: 20 (05/21/17 2115)  BP: (!) 135/54 (05/21/17 2115)  SpO2: 97 % (05/21/17 2115)    Vital Signs (24h Range):   Temp:  [97.9 °F (36.6 °C)-99.1 °F (37.3 °C)] 99.1 °F (37.3 °C)  Pulse:  [] 112  Resp:  [10-46] 20  SpO2:  [93 %-100 %] 97 %  BP: ()/(50-92) 135/54    ICP/CPP (Last 24h):        I & O (Last 24h):    Intake/Output Summary (Last 24 hours) at 05/21/17 2144  Last data filed at 05/21/17 1840   Gross per 24 hour   Intake             2.08 ml   Output                0 ml   Net             2.08 ml       Physical Exam:  Neuro:  GCS: E3 V4 M6   Left Upper Extremity:   -Localizes  Briskly.   -Radial: Palpable 2+.  Right Upper Extremity:   -Follows commands.  -Radial: Palpable 2+.  Left Lower Extremity:   -Localizes Briskly, Moves Spontaneously.   -DP: Palpable 2+. PT: Palpable 2+.  Right Lower Extremity:   -Localizes Briskly, Moves Spontaneously.   -DP: Palpable 2+. PT: Palpable 2+  Eyes: Pupils equal and reactive to light, conjugate.  Corneal Reflex: Not tested.  Oculocephalics: Not tested, right gaze deviation overcome with voluntary movement.  Cough and gag: Not tested..   Foreman: Not tested.  Babinski: Absent.       Lines/Drains/Airway:          Nutrition/Tube Feeds:   Current Diet Order   Procedures    Diet NPO     If pass on Nursing Dysphagia Screen, then can give sips of water and medications.  No food intake until passes SUMMER or evaluated by speech.       Labs:  ABG: No results for input(s): PH, PO2, PCO2, HCO3, POCSATURATED, BE in the last 24 hours.  BMP:  Recent Labs  Lab 05/21/17  1454      K 3.7      CO2 20*   BUN 15   CREATININE 1.0   *   MG 2.0   PHOS 3.3     LFT: Lab Results   Component Value Date    AST 42 (H) 05/21/2017    ALT 26 05/21/2017    ALKPHOS 69 05/21/2017    BILITOT 0.9 05/21/2017    ALBUMIN 3.9 05/21/2017    PROT 6.7 05/21/2017     CBC:   Lab Results   Component Value Date    WBC 6.62 05/21/2017    HGB 9.3 (L) 05/21/2017    HCT 30.8 (L) 05/21/2017    MCV 75 (L) 05/21/2017     05/21/2017     Microbiology x 7d:   Microbiology Results (last 7 days)     ** No results found for the last 168 hours. **            ASSESSMENT/PLAN:   81 yo female with admitted to [facility] for R ICA terminus AIS s/p thrombectomy s/p tPA now with R putamenal ICH. ICHS 1. GCS E3V4M6. Neurologically stable on exam.    --No acute neurosurgical intervention required.  --Continue care per [facility].  --Will obtain interval imaging in 6-8 hours.  --Please hold all anti-platelet and anti-coagulant medications.  --Please hold chemical DVT PPX.  --Blood Pressure Parameters SBP <  150.  --Elevate head of bed 30 degrees.  --Begin q1 neurochecks.  --Begin q1 vital checks.  --Please keep pt NPO.  --We will continue to monitor closely, please contact us with any questions or concerns.    Feliz Torrez

## 2017-05-22 NOTE — HOSPITAL COURSE
5/22 CT head with large infarct.   5/23 H/H stable. CT abdomen ordered to evaluate for other source of bleed  5/24 Unable to place NG tube with fluoro.   5/25 Plan to withdraw care and transition to hospice.   5/26 Family has chosen a Hospice facility. D/c today

## 2017-05-22 NOTE — PLAN OF CARE
Problem: Patient Care Overview  Goal: Plan of Care Review  Outcome: Ongoing (interventions implemented as appropriate)  POC reviewed with pt and family at 1400.  Questions and concerns addressed with family.  Pt had MRI today.  CT planned for tomorrow morning.  Pt code status DNR.  No acute events today. Pt progressing toward goals. Will continue to monitor. See flowsheets for full assessment and VS info.

## 2017-05-22 NOTE — PROGRESS NOTES
[facility]  Neurocritical Care  Progress Note    Admit Date: 5/21/2017  Service Date: 05/22/2017  Length of Stay: 1    Subjective:     Chief Complaint: Cerebrovascular accident (CVA) due to occlusion of right carotid artery    History of Present Illness: Mrs. Knapp is an 82 year old woman with a history of PUD, hiatal hernia and polymyalgia rheumatica who was transferred from [facility] for management of stroke. She was admitted there for a GI bleed, however a scope showed no evidence of active bleeding. Around 8:20am on 5/21 she developed sudden onset of left sided weakness and right gaze preference. CTA at the outside facility showed a possible R ICA occlusion. She received tPA at 10:05am and was transferred here for possible thrombectomy.    Symptoms have not improved significantly since onset. CT head on arrival showed a small area of hypoattenuation. The decision was made to take her to IR for thrombectomy.     Hospital Course: 5/22 CT head with large infarct.     Interval History:  No events overnight. She has remained stable on room air.  She is restless which she has been since admission.     Review of Systems   Respiratory: Negative for shortness of breath.    Cardiovascular: Negative for chest pain.   Neurological: Positive for facial asymmetry and weakness.     Objective:     Vitals:  Temp: 99.5 °F (37.5 °C) (05/22/17 0701)  Pulse: (!) 116 (05/22/17 1300)  Resp: (!) 42 (05/22/17 1300)  BP: (!) 140/66 (05/22/17 1300)  SpO2: 97 % (05/22/17 1300)    Temp:  [97.9 °F (36.6 °C)-100.4 °F (38 °C)] 99.5 °F (37.5 °C)  Pulse:  [] 116  Resp:  [10-46] 42  SpO2:  [93 %-100 %] 97 %  BP: ()/(37-92) 140/66  Arterial Line BP: (119-196)/(37-61) 157/52        05/21 0701 - 05/22 0700  In: 331.9 [I.V.:331.9]  Out: 510 [Urine:510]    Physical Exam  GA: Alert, restless. No acute distress  HEENT: No scleral icterus or JVD.   Pulmonary: Clear to auscultation A/L. No wheezing, crackles, or  rhonchi.  Cardiac: RRR S1 & S2 w systolic murmur.   Abdominal: Bowel sounds present x 4. No appreciable hepatosplenomegaly.  Skin: No jaundice, rashes, or visible lesions.     Neuro:  --sedation: none  --GCS: E4V5M6  --Mental Status: awake and alert. Follows commands. Severe dysarthria  --CN II-XII: left lower facial weakness. Right gaze preference, unable to look fully left though can cross midline. Left hemianopsia  --Pupils 3mm, PERRL.   --brainstem: deferred  --Motor: 1/5 LUE, 4/5 LLE; 4+/5 RUE, 4+/5 RLE (strength exam limited by full participation)  --sensory: left yuan-hypoesthesia  --Gait: deferred        Anai Coma Scale    Medications:  Continuous  sodium chloride 0.9% Last Rate: 75 mL/hr at 05/22/17 1300   niCARdipine Last Rate: 7.5 mg/hr (05/22/17 1317)   Scheduled  atorvastatin 40 mg Daily   lisinopril 20 mg Daily   pantoprazole 40 mg BID   senna-docusate 8.6-50 mg 1 tablet BID   sodium chloride 0.9% 3 mL Q8H   PRN  acetaminophen 650 mg Q6H PRN   dextrose 50% 12.5 g PRN   glucagon (human recombinant) 1 mg PRN   hydrALAZINE 10 mg Q4H PRN   insulin aspart 0-5 Units Q6H PRN   magnesium sulfate IVPB 2 g PRN   magnesium sulfate IVPB 4 g PRN   midazolam (PF) 1 mg Q5 Min PRN   ondansetron 4 mg Q12H PRN   potassium chloride 10 mEq PRN   potassium chloride 10 mEq PRN   potassium chloride 10 mEq PRN   sodium phosphate IVPB 15 mmol PRN   sodium phosphate IVPB 20.01 mmol PRN   sodium phosphate IVPB 30 mmol PRN     Today I personally reviewed pertinent medications, imaging, lab results,     Assessment/Plan:     Neuro   * Cerebrovascular accident (CVA) due to occlusion of right carotid artery    -- s/p tPA - started @ 1005 5/21  -- taken to IR for thrombectomy - TICI 1  -- SBP goal <140  -- q1h neuro checks  -- Echo with EF 74%  -- MRI without contrast completed - large R MCA territory infarct  -- hold antiplatelets and DVT ppx  -- PT/OT/SLP  -- Vascular neurology following        Cytotoxic cerebral edema    --  consistent with ischemic stroke  -- see plan as above        Dysarthria    -- due to stroke  -- SLP consult        Flaccid hemiplegia affecting left nondominant side    -- due to stroke  -- see plan as above  -- PT/OT        Cardiac   Elevated troponin    -- from 0.708 > 2.767  -- trend troponin   -- EKG with sinus tach        Other   Acute blood loss anemia    -- History of GI bleed - admitted to OSH for symptomatic anemia  -- EGD at OSH negative for source  -- H/H here dropped from 9.3/30.8 > 7.8/25.5  -- type and screen  -- no clinical source of bleeding  -- GI consulted  -- protonix BID  -- monitor H/H q6h        Received tissue plasminogen activator (t-PA) less than 24 hours prior to arrival    -- started 5/21 at 10:05 am        Left homonymous hemianopsia    -- due to stroke  -- see plan as above            Prophylaxis:  Venous Thromboembolism: mechanical  Stress Ulcer: PPI  Ventilator Pneumonia: not applicable     Activity Orders          None        Full Code    Regina Howard MD  Neurocritical Care  [facility]

## 2017-05-22 NOTE — PLAN OF CARE
Problem: Physical Therapy Goal  Goal: Physical Therapy Goal  Goals to be met by: 2017     Patient will increase functional independence with mobility by performin. Supine to sit with Maximum Assistance  2. Sit to supine with Maximum Assistance  3. Sit to stand transfer with Maximum Assistance  4. Bed to chair transfer with Maximum Assistance using AD or NO AD  5. Gait x10 feet with Total Assistance using AD or No AD  6. Sitting at edge of bed x10 minutes with Maximum Assistance  7. Stand for x2 minutes with Maximum Assistance using AD or NO AD  8. Lower extremity exercise program x15 reps per handout, with assistance as needed  Outcome: Ongoing (interventions implemented as appropriate)    PT Evaluation complete. Recommendations pending EOB/OOB activity.    Kasandra Arnold, PT, DPT  [phone]  10/5/2016

## 2017-05-22 NOTE — EICU
To MRI via ICU bed with critical care staff RN utilizing portable critical care monitoring. VSS at the current level of support despite current neurological condition.

## 2017-05-22 NOTE — HOSPITAL COURSE
Priya Knapp is a 82 y.o. Female with R MCA syndrome treated with tPA and going for thrombectomy. Patient had CTA at OSH. In order to limit the use of contrast in this patient, it was decided to take patient for CT head. There was evidence of some early ischemic change, but decision was made to bring patient to IR. In IR, patient had tortuosity in R ICA with R carotid terminus occlusion and TICI 1 flow.    05/22/17 MRI with large R MCA infarct. Patient restless with continuous movement of right side. Currently on cardene  5/23/17 - following H&H, following commands   5/24/17 - patient with lethargy and concern for increased swelling, patients family updated - still ok with DNR/DNI - getting mannitol   5/25/17 - patient still lethargic, received dose of mannitol overnight, CT 5/24 with slight increased mass effect

## 2017-05-22 NOTE — PT/OT/SLP PROGRESS
Occupational Therapy      Priya Knapp  MRN: [id number]    Patient not seen today secondary to MD hold (Comment) (nursing hold secondary to elevated HR. Pt RR in the 40s at rest. ). Will follow-up tomorrow.    JACKY Malik  5/22/2017

## 2017-05-22 NOTE — MEDICAL/APP STUDENT
[facility]  Gastroenterology  Consult Note    Patient Name: Priya Knapp  MRN: [id number]  Admission Date: 5/21/2017  Hospital Length of Stay: 1 days  Code Status: Full Code   Attending Provider: {ATTENDING PROVIDER:[id number]}  Consulting Provider: Belkis Menjivar  Primary Care Physician: Primary Doctor No  Principal Problem:Cerebrovascular accident (CVA) due to occlusion of right carotid artery    Consults  Subjective:     HPI: Ms. Priya Knapp is an 82 year old female with a hiatal hernia who was transferred from [facility] for management of an ischemic stroke and left hemiparesis due to a R. MCA infarct. GI was consulted as patient was admitted to [facility] for a hiatal hernia and chronic gastric ulcer at the time of the stroke.     Per her son, over the past few years, she has had a chronic bleeding ulcer due to a known hiatal hernia. She occasionally has dark tarry stools and has received at least one blood transfusion/ year for anemia over the past several years. Recently, she was admitted for a current episode of dark tarry stools and an EGD was done at [facility] showing no source. Plan was to have the hernia repaired today. Son not sure what the procedure was (possibly a fundoplication).     At around 8:20 am on 5/21 she had sudden onset left sided weakness and a CT was performed showing a possible R. ICA occlusion. TPA was given at 10:05am before she was transferred here for a thrombectomy. After the mechanical and aspiration thrombectomy, she was found to have a right putamenal ICH. Blood is centered in the right lentiform nucleus and external capsule. Patient was admitted to neurocritical care for post-tPA monitoring. There was no change in her neuroexam but nurses noted that she later became more difficult to arouse.     Today, she is awake and alert, following commands. History is difficult to obtain due to severe dysarthria. Majority of history was per her son and the  neurosurgery team.    Past Medical History:   Diagnosis Date    Hiatal hernia     Polymyalgia rheumatica        Past Surgical History:   Procedure Laterality Date    BACK SURGERY      CARPAL TUNNEL RELEASE Bilateral     HYSTERECTOMY      TOTAL KNEE ARTHROPLASTY Bilateral        Review of patient's allergies indicates:   Allergen Reactions    Codeine     Darvon [propoxyphene]     Macrodantin [nitrofurantoin macrocrystal]     Penicillins     Pneumococcal 23-erika ps vaccine     Talwin [pentazocine lactate]      Family History     Family history is unknown by patient.        Social History Main Topics    Smoking status: Never Smoker    Smokeless tobacco: Not on file    Alcohol use No    Drug use: No    Sexual activity: Not on file     Review of Systems   Unable to perform ROS: Other (Limited due to patient's dysarthria)   Constitutional: Negative for fatigue and fever.   Gastrointestinal: Negative for abdominal pain, constipation and diarrhea.     Objective:     Vital Signs (Most Recent):  Temp: 99.5 °F (37.5 °C) (05/22/17 0701)  Pulse: (!) 112 (05/22/17 0900)  Resp: 20 (05/22/17 0900)  BP: (!) 119/56 (05/22/17 0701)  SpO2: 97 % (05/22/17 0900) Vital Signs (24h Range):  Temp:  [97.9 °F (36.6 °C)-100.4 °F (38 °C)] 99.5 °F (37.5 °C)  Pulse:  [] 112  Resp:  [10-46] 20  SpO2:  [93 %-100 %] 97 %  BP: ()/(37-92) 119/56  Arterial Line BP: (119-196)/(37-61) 140/47     Weight: 62.3 kg (137 lb 5.6 oz) (05/21/17 1827)  Body mass index is 21.51 kg/m².      Intake/Output Summary (Last 24 hours) at 05/22/17 1103  Last data filed at 05/22/17 0900   Gross per 24 hour   Intake          1050.63 ml   Output              510 ml   Net           540.63 ml       Lines/Drains/Airways     Arterial Line                 Arterial Line 05/21/17 2100 Left Radial less than 1 day          Peripheral Intravenous Line                 Peripheral IV - Single Lumen 05/21/17 0000 Left Forearm 1 day         Peripheral IV - Single  Lumen 05/21/17 0000 Right Forearm 1 day         Peripheral IV - Single Lumen 05/21/17 0000 Right Forearm 1 day         Peripheral IV - Single Lumen 05/21/17 1452 Left Forearm less than 1 day                Physical Exam   Constitutional: She is oriented to person, place, and time. She is cooperative.   Awake   HENT:   Head: Normocephalic and atraumatic.   Eyes: EOM are normal. Pupils are equal, round, and reactive to light.   Right gaze deviation overcome with concentration,   Cardiovascular: Normal rate, regular rhythm, S1 normal, S2 normal and normal pulses.  Exam reveals no gallop, no S4 and no friction rub.    No murmur heard.  Pulmonary/Chest: Effort normal. She has no decreased breath sounds. She has no wheezes. She has no rhonchi. She has no rales.   Abdominal: Normal appearance and bowel sounds are normal. She exhibits no distension. There is no hepatosplenomegaly. There is no tenderness.   Neurological: She is oriented to person, place, and time. A cranial nerve deficit is present. No sensory deficit. GCS eye subscore is 4. GCS verbal subscore is 5. GCS motor subscore is 6. She displays no Babinski's sign on the right side.   Reflex Scores:       Tricep reflexes are 2+ on the right side and 3+ on the left side.       Bicep reflexes are 2+ on the right side and 3+ on the left side.       Patellar reflexes are 2+ on the right side and 3+ on the left side.       Achilles reflexes are 2+ on the right side and 3+ on the left side.  Dysarthria, ptosis of left lid, strength 1/5 on left side       Significant Labs:  CBC:   Recent Labs  Lab 05/21/17  1454 05/21/17  2105 05/21/17  2236   WBC 6.62  --  5.96   HGB 9.3* 7.9* 7.8*  7.8*   HCT 30.8* 26.3* 25.5*  25.5*     --  201       Significant Imaging:  Abdominal X-ray pending    MRI Brain 05/22 showed large acute R. MCA infarct with small right BRIDGET infarct and mild chronic microvascular ischemic changes.      Assessment/Plan:     Active Diagnoses:     Diagnosis Date Noted POA    PRINCIPAL PROBLEM:  Cerebrovascular accident (CVA) due to occlusion of right carotid artery [I63.231] 05/21/2017 Yes    Flaccid hemiplegia affecting left nondominant side [G81.04] 05/21/2017 Yes    Dysarthria [R47.1] 05/21/2017 Yes    Left homonymous hemianopsia [H53.462] 05/21/2017 Yes    Cytotoxic cerebral edema [G93.6] 05/21/2017 Yes    Received tissue plasminogen activator (t-PA) less than 24 hours prior to arrival [Z92.82] 05/21/2017 Not Applicable      Problems Resolved During this Admission:    Diagnosis Date Noted Date Resolved POA    Weakness on left side of face [R29.810] 05/21/2017 05/21/2017 Yes     82 year old female with a presumed upper GI bleed with her history of dark tarry stools and hiatal hernia. EGD at [facility] found no source of active bleeding. No evidence of active bleeding currently but as she recently received tPA, we will monitor H/H for significant drops and consider an urgent EGD in the presence of an active bleed.    Thank you for your consult. Will follow H/H.    Belkis Menjivar   Medical Student  Gastroenterology  [facility]

## 2017-05-22 NOTE — NURSING
Float nurse attempted to insert NGT.  Per float nurse, pt became agitated; BP, HR, RR increased; nose began to bleed.  No further attempts made.  NCC team notified.  Will try to insert NGT again this afternoon when pt is calm.  Will continue to monitor.

## 2017-05-22 NOTE — PLAN OF CARE
Problem: Patient Care Overview  Goal: Plan of Care Review  Outcome: Ongoing (interventions implemented as appropriate)  Arrived back from IR for thrombectomy. Pt to lie flat with right leg straight until 8pm. Right groin site intact with no hematoma. 2+ pulses distal to site. NIH completed. VS and assessment per flow sheet, patient progressing towards goal as tolerated. Plan of care reviewed with Priya Knapp all concerns addressed. Will continue to monitor.

## 2017-05-22 NOTE — ASSESSMENT & PLAN NOTE
-- s/p tPA - started @ 1005 5/21  -- taken to IR for thrombectomy - TICI 1  -- SBP goal <140  -- q1h neuro checks  -- Echo with EF 74%  -- MRI without contrast completed - large R MCA territory infarct  -- hold antiplatelets and DVT ppx  -- PT/OT/SLP  -- Vascular neurology following

## 2017-05-22 NOTE — CONSULTS
[facility]  Gastroenterology  Consult Note    Patient Name: Priya Knapp  MRN: [id number]  Admission Date: 5/21/2017  Hospital Length of Stay: 1 days  Code Status: Full Code   Attending Provider: Gautam Merritt MD   Consulting Provider: Layo Thornton MD  Primary Care Physician: Primary Doctor No  Principal Problem:Cerebrovascular accident (CVA) due to occlusion of right carotid artery    Inpatient consult to Gastroenterology  Consult performed by: LAYO THORNTON  Consult ordered by: CLAU JULIAN  Reason for consult: anemia        Subjective:     HPI:    Priya Knapp is a 82 y.o. female with a hiatal hernia and vanessa ulcers who was transferred on 5/21/2017 from [facility] for management of an ischemic stroke and left hemiparesis due to a R. MCA infarct. He underwent tPA and thrombectomy. GI was consulted as patient was admitted to [facility] for a hiatal hernia and melena at the time of the stroke. Per her son, over the past few years, she has had a chronic bleeding lesion due to a known hiatal hernia and scheduled to have hernia repair after most recent EGD on 5/18 which showed hiatal hernia and vanessa lesions. History is difficult to obtain due to severe dysarthria. Majority of history was per her son and the neurosurgery team. She has been stable with no signs of overt GI bleeding.     Review of Systems:  Unable to obtain due to medical condition    Past Medical History: has a past medical history of Hiatal hernia and Polymyalgia rheumatica.    Past Surgical History: has a past surgical history that includes Hysterectomy; Total knee arthroplasty (Bilateral); Carpal tunnel release (Bilateral); and Back surgery.    Family History:Family history is unknown by patient.    Allergies: Reviewed in EPIC.     Social History: reports that she has never smoked. She does not have any smokeless tobacco history on file. She reports that she does not drink alcohol  or use drugs.    Home medications:   No current facility-administered medications on file prior to encounter.      No current outpatient prescriptions on file prior to encounter.       Scheduled Meds:    atorvastatin  40 mg Per NG tube Daily    lisinopril  20 mg Per NG tube Daily    pantoprazole  40 mg Intravenous BID    senna-docusate 8.6-50 mg  1 tablet Per NG tube BID    sodium chloride 0.9%  3 mL Intravenous Q8H       Continuous Infusions:    sodium chloride 0.9% 75 mL/hr at 05/22/17 1101    niCARdipine Stopped (05/22/17 1000)       PRN Meds: acetaminophen, dextrose 50%, glucagon (human recombinant), hydrALAZINE, insulin aspart, magnesium sulfate IVPB, magnesium sulfate IVPB, midazolam (PF), ondansetron, potassium chloride, potassium chloride, potassium chloride, sodium phosphate IVPB, sodium phosphate IVPB, sodium phosphate IVPB    Vital signs:  Temp:  [97.9 °F (36.6 °C)-100.4 °F (38 °C)]   Pulse:  []   Resp:  [10-46]   BP: ()/(37-92)   SpO2:  [93 %-100 %]   Arterial Line BP: (119-196)/(37-61)     Physical exam:  General: No acute distress  HEENT: Head: Normocephalic, atraumatic.   Eyes: Sclera non-icteric.   Neck: Supple  Heart: Regular rate and rhythm, no gallops  Lungs: Non labored breathing, no wheezing  Abdomen: Bowel sounds normal, no organomegaly, masses, or hernia. No tenderness, no rebound or guarding. No distention.   Rectal: Deferred  Extremities: No deformities, no C/C/E  Neurologic: residual neurological deficits.   Skin: No rash on visible areas     Routine labs:    Recent Labs  Lab 05/21/17 2236   WBC 5.96   HGB 7.8*  7.8*   HCT 25.5*  25.5*      MCV 74*       Recent Labs  Lab 05/21/17 2236 05/22/17  0332   INR 1.1 1.0   APTT 21.5  --        Recent Labs  Lab 05/21/17  1454      K 3.7   CO2 20*   BUN 15   CREATININE 1.0       Recent Labs  Lab 05/21/17  1454   ALBUMIN 3.9   ALKPHOS 69   ALT 26   AST 42*   BILITOT 0.9     Imaging and prior endoscopies  As  above    Assessment/Plan:     Priya Knapp is a 82 y.o. female with presumed upper GI bleed with her history of dark tarry stools and hiatal hernia. EGD at [facility] found no source of active bleeding other than vanessa erosion which might be the source of chronic blood loss. No evidence of active bleeding currently but as she recently received tPA, we will monitor H/H for significant drops and consider an urgent EGD in the presence of an active bleed.    Acute blood loss anemia    .        Flaccid hemiplegia affecting left nondominant side    .        * Cerebrovascular accident (CVA) due to occlusion of right carotid artery    .            Patient was seen and examined with MS4, Belkis Menjivar and discussed with staff.     Lachelle Cruz MD  Gastroenterology  [facility]

## 2017-05-22 NOTE — SUBJECTIVE & OBJECTIVE
Interval History:  No events overnight. She has remained stable on room air.  She is restless which she has been since admission.     Review of Systems   Respiratory: Negative for shortness of breath.    Cardiovascular: Negative for chest pain.   Neurological: Positive for facial asymmetry and weakness.     Objective:     Vitals:  Temp: 99.5 °F (37.5 °C) (05/22/17 0701)  Pulse: (!) 116 (05/22/17 1300)  Resp: (!) 42 (05/22/17 1300)  BP: (!) 140/66 (05/22/17 1300)  SpO2: 97 % (05/22/17 1300)    Temp:  [97.9 °F (36.6 °C)-100.4 °F (38 °C)] 99.5 °F (37.5 °C)  Pulse:  [] 116  Resp:  [10-46] 42  SpO2:  [93 %-100 %] 97 %  BP: ()/(37-92) 140/66  Arterial Line BP: (119-196)/(37-61) 157/52        05/21 0701 - 05/22 0700  In: 331.9 [I.V.:331.9]  Out: 510 [Urine:510]    Physical Exam  GA: Alert, restless. No acute distress  HEENT: No scleral icterus or JVD.   Pulmonary: Clear to auscultation A/L. No wheezing, crackles, or rhonchi.  Cardiac: RRR S1 & S2 w systolic murmur.   Abdominal: Bowel sounds present x 4. No appreciable hepatosplenomegaly.  Skin: No jaundice, rashes, or visible lesions.     Neuro:  --sedation: none  --GCS: [id number]  --Mental Status: awake and alert. Follows commands. Severe dysarthria  --CN II-XII: left lower facial weakness. Right gaze preference, unable to look fully left though can cross midline. Left hemianopsia  --Pupils 3mm, PERRL.   --brainstem: deferred  --Motor: 1/5 LUE, 4/5 LLE; 4+/5 RUE, 4+/5 RLE (strength exam limited by full participation)  --sensory: left yuan-hypoesthesia  --Gait: deferred        Anai Coma Scale    Medications:  Continuous  sodium chloride 0.9% Last Rate: 75 mL/hr at 05/22/17 1300   niCARdipine Last Rate: 7.5 mg/hr (05/22/17 1317)   Scheduled  atorvastatin 40 mg Daily   lisinopril 20 mg Daily   pantoprazole 40 mg BID   senna-docusate 8.6-50 mg 1 tablet BID   sodium chloride 0.9% 3 mL Q8H   PRN  acetaminophen 650 mg Q6H PRN   dextrose 50% 12.5 g PRN   glucagon  (human recombinant) 1 mg PRN   hydrALAZINE 10 mg Q4H PRN   insulin aspart 0-5 Units Q6H PRN   magnesium sulfate IVPB 2 g PRN   magnesium sulfate IVPB 4 g PRN   midazolam (PF) 1 mg Q5 Min PRN   ondansetron 4 mg Q12H PRN   potassium chloride 10 mEq PRN   potassium chloride 10 mEq PRN   potassium chloride 10 mEq PRN   sodium phosphate IVPB 15 mmol PRN   sodium phosphate IVPB 20.01 mmol PRN   sodium phosphate IVPB 30 mmol PRN     Today I personally reviewed pertinent medications, imaging, lab results,

## 2017-05-22 NOTE — PROCEDURES
"Priya Knapp is a 82 y.o. female patient.    Temp: 99.1 °F (37.3 °C) (17)  Pulse: 110 (17)  Resp: (!) 21 (17)  BP: 120/60 (17)  SpO2: 98 % (17)  Weight: 62.3 kg (137 lb 5.6 oz) (17)  Height: 5' 7" (170.2 cm) (17 1442)       Arterial Line  Date/Time: 2017 11:01 PM  Performed by: JAH EARLY  Authorized by: HIWOT GARCIA   Consent Done: Yes  Consent: Verbal consent obtained.  Consent given by: spouse  Required items: required blood products, implants, devices, and special equipment available  Patient identity confirmed: , MRN, name and verbally with patient  Time out: Immediately prior to procedure a "time out" was called to verify the correct patient, procedure, equipment, support staff and site/side marked as required.  Preparation: Patient was prepped and draped in the usual sterile fashion.  Indications: multiple ABGs, respiratory failure and hemodynamic monitoring  Location: left radial  Patient sedated: no  Kunal's test normal: yes  Needle gauge: 20  Seldinger technique: Seldinger technique used  Number of attempts: 1  Complications: No  Post-procedure: dressing applied  Post-procedure CMS: normal  Patient tolerance: Patient tolerated the procedure well with no immediate complications          Jah Early PA-C  2017  "

## 2017-05-22 NOTE — SUBJECTIVE & OBJECTIVE
Neurologic Chief Complaint: R MCA    Subjective:     Interval History: Patient is seen for follow-up neurological assessment and treatment recommendations: SAADIA    HPI, Past Medical, Family, and Social History remains the same as documented in the initial encounter.     Review of Systems   Constitutional: Negative for fever.        Restless   HENT: Negative for drooling.    Eyes: Negative for visual disturbance.   Respiratory: Negative for cough and shortness of breath.    Cardiovascular: Negative for chest pain and palpitations.   Gastrointestinal: Negative for nausea and vomiting.   Neurological: Positive for facial asymmetry and weakness. Negative for speech difficulty.   Psychiatric/Behavioral: Negative for agitation and behavioral problems.     Scheduled Meds:   atorvastatin  40 mg Per NG tube Daily    lisinopril  20 mg Per NG tube Daily    pantoprazole  40 mg Intravenous BID    senna-docusate 8.6-50 mg  1 tablet Per NG tube BID    sodium chloride 0.9%  3 mL Intravenous Q8H     Continuous Infusions:   sodium chloride 0.9% 75 mL/hr at 05/22/17 1400    niCARdipine 2.5 mg/hr (05/22/17 1444)     PRN Meds:acetaminophen, dextrose 50%, glucagon (human recombinant), hydrALAZINE, insulin aspart, magnesium sulfate IVPB, magnesium sulfate IVPB, ondansetron, potassium chloride, potassium chloride, potassium chloride, sodium phosphate IVPB, sodium phosphate IVPB, sodium phosphate IVPB    Objective:     Vital Signs (Most Recent):  Temp: 99.3 °F (37.4 °C) (05/22/17 1101)  Pulse: (!) 121 (05/22/17 1400)  Resp: (!) 66 (05/22/17 1400)  BP: (!) 128/59 (05/22/17 1400)  SpO2: 97 % (05/22/17 1400)  BP Location: Right arm    Vital Signs Range (Last 24H):  Temp:  [99 °F (37.2 °C)-100.4 °F (38 °C)]   Pulse:  []   Resp:  [10-66]   BP: ()/(37-73)   SpO2:  [94 %-100 %]   Arterial Line BP: (119-196)/(37-61)   BP Location: Right arm    Physical Exam   Constitutional: She appears well-developed and well-nourished. No  distress.   HENT:   Head: Normocephalic and atraumatic.   Eyes: Pupils are equal, round, and reactive to light.   Cardiovascular: Tachycardia present.    Pulmonary/Chest: Effort normal. No respiratory distress.   Abdominal: Soft.   Neurological: She is alert.   Skin: Skin is warm and dry.   Psychiatric: She has a normal mood and affect. She is hyperactive (continuous movement on R side).   Vitals reviewed.      Neurological Exam:   LOC: follows requests and drowsy  Language: No aphasia  Speech: Dysarthria  Orientation: Person, Place, Time  Memory: Recent memory intact, Remote memory intact, Age correct, Month correct  Visual Fields (CN II): Hemianopsia  left  EOM (CN III, IV, VI): Gaze preference right  Facial Movement (CN VII): lower weakness left lower  Hearing (CN VIII): intact bilaterally  Motor*: Arm Left:  Plegic (0/5), Leg Left:   Paretic:  1/5, Arm Right:   Normal (5/5), Leg Right:   Normal (5/5)  Sensation: intact to light touch, temperature and vibration  Tone: Arm-Left: flaccid; Leg-Left: normal; Arm-Right: normal; Leg-Right: normal    NIH Stroke Scale:    Level of Consciousness: 1 - drowsy  LOC Questions: 0 - answers both correctly  LOC Commands: 1 - performs one correctly  Best Gaze: 2 - forced deviation  Visual: 2 - complete hemianopia  Facial Palsy: 1 - minor  Motor Left Arm: 4 - no movement  Motor Right Arm: 0 - no drift  Motor Left Leg: 3 - no effort against gravity  Motor Right Le - no drift  Limb Ataxia: 0 - absent  Sensory: 0 - normal  Best Language: 0 - no aphasia  Dysarthria: 1 - mild to moderate dysarthria  Extinction and Inattention: 2 - complete neglect      Laboratory:  CMP: No results for input(s): GLUCOSE, CALCIUM, ALBUMIN, PROT, NA, K, CO2, CL, BUN, CREATININE, ALKPHOS, ALT, AST, BILITOT in the last 24 hours.  CBC:   Recent Labs  Lab 17  2236 17  1312   WBC 5.96  --    RBC 3.43*  --    HGB 7.8*  7.8* 7.5*   HCT 25.5*  25.5* 24.4*     --    MCV 74*  --    MCH  22.7*  --    MCHC 30.6*  --      Lipid Panel:   Recent Labs  Lab 05/21/17  1454   CHOL 187   LDLCALC 117.0   HDL 56   TRIG 70     Coagulation:   Recent Labs  Lab 05/21/17  2236 05/22/17  0332   INR 1.1 1.0   APTT 21.5  --      Platelet Aggregation Study: No results for input(s): PLTAGG, PLTAGINTERP, PLTAGREGLACO, ADPPLTAGGREG in the last 168 hours.  Hgb A1C:   Recent Labs  Lab 05/21/17  1454   HGBA1C 5.7     TSH:   Recent Labs  Lab 05/21/17  1454   TSH 1.409       Diagnostic Results:  I have personally reviewed:     MRI Brain. Date: 05/22/17  Large acute right MCA distribution infarct as above.    Additional small right BRIDGET infarct.    Mild chronic microvascular ischemic changes.    CT Head. Date: 05/22/17  Limited exam given marked motion artifact.    Temporal evolution of large right MCA distribution infarction with less conspicuous focus of high attenuation within the right basal ganglia.  No new hemorrhage or new infarct.  No hydrocephalus.    CT Head. Date: 05/21/17  Interval development of small right basal ganglia hemorrhage versus contrast staining..    Subtle loss of gray-white differentiation and sulcal effacement in the posterior right frontal lobe compatible with evolution of acute right MCA territory infarct.      CTA Head from OSH. Date: 05/21/17  Occlusion of R supraclinoid carotid artery with nonvisualization of the M1 segment, possibly on the basis of intraluminal thrombus. R M2 segment is in part reconstituted via lenticulostriate collaterals. Dominant supply of the R BRIDGET territory via the a comm    Echo. Date: 05/22/17  -EF 73%  -no LAE  -concentric remodeling  -pulmonary HTN

## 2017-05-22 NOTE — ASSESSMENT & PLAN NOTE
-- History of GI bleed - admitted to OSH for symptomatic anemia  -- EGD at OSH negative for source  -- H/H here dropped from 9.3/30.8 > 7.8/25.5  -- type and screen  -- no clinical source of bleeding  -- GI consulted  -- protonix BID  -- monitor H/H q6h

## 2017-05-22 NOTE — PROGRESS NOTES
[facility]  Vascular Neurology  Comprehensive Stroke Center  Progress Note      Assessment/Plan:     Priya Knapp is a 82 y.o. Female with R MCA syndrome treated with tPA and going for thrombectomy. Patient had CTA at OSH. In order to limit the use of contrast in this patient, it was decided to take patient for CT head. There was evidence of some early ischemic change, but decision was made to bring patient to IR. In IR, patient had tortuosity in R ICA with R carotid terminus occlusion and TICI 1 flow.    05/22/17 MRI with large R MCA infarct. Patient restless with continuous movement of right side. Currently on cardene    * Embolic stroke involving right middle cerebral artery    Priya Knapp is a 82 y.o. female with R MCA treated with tPA and thrombectomy    Hold antiplatelets until 24 hours post tPA  Atorvastatin 40  SBP <180 post tPA  PT/OT and speech to evaluate and treat  VTE ppx with SCDs, start sq heparin 24 hours post tPA  Neuro checks q1h                                      Cytotoxic cerebral edema    2/2 infarct  Evident on imaging        Left homonymous hemianopsia    2/2 stroke        Dysarthria    2/2 stroke  Aggressive speech therapy        Flaccid hemiplegia affecting left nondominant side    2/2 stroke  Aggressive PT and OT            Neurologic Chief Complaint: R MCA    Subjective:     Interval History: Patient is seen for follow-up neurological assessment and treatment recommendations: RITAEON    HPI, Past Medical, Family, and Social History remains the same as documented in the initial encounter.     Review of Systems   Constitutional: Negative for fever.        Restless   HENT: Negative for drooling.    Eyes: Negative for visual disturbance.   Respiratory: Negative for cough and shortness of breath.    Cardiovascular: Negative for chest pain and palpitations.   Gastrointestinal: Negative for nausea and vomiting.   Neurological: Positive for facial asymmetry and weakness.  Negative for speech difficulty.   Psychiatric/Behavioral: Negative for agitation and behavioral problems.     Scheduled Meds:   atorvastatin  40 mg Per NG tube Daily    lisinopril  20 mg Per NG tube Daily    pantoprazole  40 mg Intravenous BID    senna-docusate 8.6-50 mg  1 tablet Per NG tube BID    sodium chloride 0.9%  3 mL Intravenous Q8H     Continuous Infusions:   sodium chloride 0.9% 75 mL/hr at 05/22/17 1400    niCARdipine 2.5 mg/hr (05/22/17 1444)     PRN Meds:acetaminophen, dextrose 50%, glucagon (human recombinant), hydrALAZINE, insulin aspart, magnesium sulfate IVPB, magnesium sulfate IVPB, ondansetron, potassium chloride, potassium chloride, potassium chloride, sodium phosphate IVPB, sodium phosphate IVPB, sodium phosphate IVPB    Objective:     Vital Signs (Most Recent):  Temp: 99.3 °F (37.4 °C) (05/22/17 1101)  Pulse: (!) 121 (05/22/17 1400)  Resp: (!) 66 (05/22/17 1400)  BP: (!) 128/59 (05/22/17 1400)  SpO2: 97 % (05/22/17 1400)  BP Location: Right arm    Vital Signs Range (Last 24H):  Temp:  [99 °F (37.2 °C)-100.4 °F (38 °C)]   Pulse:  []   Resp:  [10-66]   BP: ()/(37-73)   SpO2:  [94 %-100 %]   Arterial Line BP: (119-196)/(37-61)   BP Location: Right arm    Physical Exam   Constitutional: She appears well-developed and well-nourished. No distress.   HENT:   Head: Normocephalic and atraumatic.   Eyes: Pupils are equal, round, and reactive to light.   Cardiovascular: Tachycardia present.    Pulmonary/Chest: Effort normal. No respiratory distress.   Abdominal: Soft.   Neurological: She is alert.   Skin: Skin is warm and dry.   Psychiatric: She has a normal mood and affect. She is hyperactive (continuous movement on R side).   Vitals reviewed.      Neurological Exam:   LOC: follows requests and drowsy  Language: No aphasia  Speech: Dysarthria  Orientation: Person, Place, Time  Memory: Recent memory intact, Remote memory intact, Age correct, Month correct  Visual Fields (CN II):  Hemianopsia  left  EOM (CN III, IV, VI): Gaze preference right  Facial Movement (CN VII): lower weakness left lower  Hearing (CN VIII): intact bilaterally  Motor*: Arm Left:  Plegic (0/5), Leg Left:   Paretic:  1/5, Arm Right:   Normal (5/5), Leg Right:   Normal (5/5)  Sensation: intact to light touch, temperature and vibration  Tone: Arm-Left: flaccid; Leg-Left: normal; Arm-Right: normal; Leg-Right: normal    NIH Stroke Scale:    Level of Consciousness: 1 - drowsy  LOC Questions: 0 - answers both correctly  LOC Commands: 1 - performs one correctly  Best Gaze: 2 - forced deviation  Visual: 2 - complete hemianopia  Facial Palsy: 1 - minor  Motor Left Arm: 4 - no movement  Motor Right Arm: 0 - no drift  Motor Left Leg: 3 - no effort against gravity  Motor Right Le - no drift  Limb Ataxia: 0 - absent  Sensory: 0 - normal  Best Language: 0 - no aphasia  Dysarthria: 1 - mild to moderate dysarthria  Extinction and Inattention: 2 - complete neglect      Laboratory:  CMP: No results for input(s): GLUCOSE, CALCIUM, ALBUMIN, PROT, NA, K, CO2, CL, BUN, CREATININE, ALKPHOS, ALT, AST, BILITOT in the last 24 hours.  CBC:   Recent Labs  Lab 17  1312   WBC 5.96  --    RBC 3.43*  --    HGB 7.8*  7.8* 7.5*   HCT 25.5*  25.5* 24.4*     --    MCV 74*  --    MCH 22.7*  --    MCHC 30.6*  --      Lipid Panel:   Recent Labs  Lab 17  1454   CHOL 187   LDLCALC 117.0   HDL 56   TRIG 70     Coagulation:   Recent Labs  Lab 17  0332   INR 1.1 1.0   APTT 21.5  --      Platelet Aggregation Study: No results for input(s): PLTAGG, PLTAGINTERP, PLTAGREGLACO, ADPPLTAGGREG in the last 168 hours.  Hgb A1C:   Recent Labs  Lab 17  1454   HGBA1C 5.7     TSH:   Recent Labs  Lab 17  1454   TSH 1.409       Diagnostic Results:  I have personally reviewed:     MRI Brain. Date: 17  Large acute right MCA distribution infarct as above.    Additional small right BRIDGET infarct.    Mild  chronic microvascular ischemic changes.    CT Head. Date: 05/22/17  Limited exam given marked motion artifact.    Temporal evolution of large right MCA distribution infarction with less conspicuous focus of high attenuation within the right basal ganglia.  No new hemorrhage or new infarct.  No hydrocephalus.    CT Head. Date: 05/21/17  Interval development of small right basal ganglia hemorrhage versus contrast staining..    Subtle loss of gray-white differentiation and sulcal effacement in the posterior right frontal lobe compatible with evolution of acute right MCA territory infarct.      CTA Head from OSH. Date: 05/21/17  Occlusion of R supraclinoid carotid artery with nonvisualization of the M1 segment, possibly on the basis of intraluminal thrombus. R M2 segment is in part reconstituted via lenticulostriate collaterals. Dominant supply of the R BRIDGET territory via the a comm    Echo. Date: 05/22/17  -EF 73%  -no LAE  -concentric remodeling  -pulmonary HTN          Maggie Cruz PA-C  Comprehensive Stroke Center  Department of Vascular Neurology   [facility]

## 2017-05-22 NOTE — PLAN OF CARE
This CM spoke with patient family at bedside, son did not know patient pharmacy or pcp. Patient at procedure.       05/22/17 1223   Discharge Assessment   Assessment Type Discharge Planning Assessment   Confirmed/corrected address and phone number on facesheet? Yes   Assessment information obtained from? Caregiver   Expected Length of Stay (days) 3   Communicated expected length of stay with patient/caregiver yes   Prior to hospitilization cognitive status: Alert/Oriented   Prior to hospitalization functional status: Independent   Current cognitive status: Unable to Assess   Current Functional Status: Needs Assistance   Arrived From acute care hospital  (transfers via ambulance from [facility])   Lives With alone   Able to Return to Prior Arrangements yes   Is patient able to care for self after discharge? Unable to determine at this time (comments)   How many people do you have in your home that can help with your care after discharge? 3   Who are your caregiver(s) and their phone number(s)? (patient sons Josee Knapp [phone] & Josh Knapp [phone] & Lisa [phone])   Patient's perception of discharge disposition other (comments)  (patient at procedure)   Readmission Within The Last 30 Days no previous admission in last 30 days   Patient currently being followed by outpatient case management? No   Patient currently receives home health services? No   Does the patient currently use HME? No   Patient currently receives private duty nursing? No   Patient currently receives any other outside agency services? No   Equipment Currently Used at Home none   Do you have any problems affording any of your prescribed medications? No   Is the patient taking medications as prescribed? yes   Do you have any financial concerns preventing you from receiving the healthcare you need? No   Does the patient have transportation to healthcare appointments? Yes   Transportation Available family or friend will provide    On Dialysis? No   Does the patient receive services at the Coumadin Clinic? No   Are there any open cases? No   Discharge Plan A Rehab   Discharge Plan B Home;Home Health   Patient/Family In Agreement With Plan yes       Laura Parada CM  [id number]

## 2017-05-22 NOTE — PT/OT/SLP EVAL
"Speech Language Pathology  Evaluation    Priya Knapp   MRN: [id number]   Admitting Diagnosis: Cerebrovascular accident (CVA) due to occlusion of right carotid artery    Diet recommendations: Solid Diet Level: NPO  Liquid Diet Level: NPO     SLP Treatment Date: 17  Speech Start Time: 1055     Speech Stop Time: 1105     Speech Total (min): 10 min       TREATMENT BILLABLE MINUTES:  Eval Swallow and Oral Function 10    Diagnosis: Cerebrovascular accident (CVA) due to occlusion of right carotid artery      Past Medical History:   Diagnosis Date    Hiatal hernia     Polymyalgia rheumatica      Past Surgical History:   Procedure Laterality Date    BACK SURGERY      CARPAL TUNNEL RELEASE Bilateral     HYSTERECTOMY      TOTAL KNEE ARTHROPLASTY Bilateral        Has the patient been evaluated by SLP for swallowing? : Yes  Keep patient NPO?: Yes   General Precautions: Standard, aspiration    Current Respiratory Status: nasal cannula     Social Hx: Patient lives with self    Prior diet: regular      Subjective:  "She has partial plates" per son  Pain Ratin/10     Pain Rating Post-Intervention: 0/10    Objective:        Oral Musculature Evaluation  Oral Musculature: left weakness, facial asymmetry present  Dentition: uses dentures to eat, scattered dentition  Mucosal Quality: cracked, dry  Oral Labial Strength and Mobility: impaired retraction, impaired pursing, impaired seal  Lingual Strength and Mobility: impaired strength, impaired depression, impaired protrusion  Velar Elevation: impaired  Buccal Strength and Mobility: impaired  Volitional Cough: fair  Volitional Swallow: not elicited  Voice Prior to PO Intake: hoarse     Bedside Swallow Eval:  Consistencies Assessed: Thin liquids 1 teaspoon x3 trials  Oral Phase: decreased closure around utensil; Pt. Did not remove the bolus from the spoon: spontaneous throat clearing noted through out session  Pharyngeal Phase: pharyngeal swallow not " elicited      Assessment:  Priya Knapp is a 82 y.o. female with a medical diagnosis of Cerebrovascular accident (CVA) due to occlusion of right carotid artery and presents with oral pharyngeal dysphagai    Do you have any cultural, spiritual, Taoist conflicts, given your current situation?: no     Discharge recommendations: Discharge Facility/Level Of Care Needs: rehabilitation facility     Goals:    SLP Goals        Problem: SLP Goal    Goal Priority Disciplines Outcome   SLP Goal     SLP Ongoing (interventions implemented as appropriate)   Description:  Goals:  1.  Participate in ongoing assessment of swallow   2.  Participate in speech language eval                     Plan:   Patient to be seen Therapy Frequency: 5 x/week   Plan of Care expires: 06/20/17  Plan of Care reviewed with: patient, family  SLP Follow-up?: Yes  SLP - Next Visit Date: 05/23/17           Mary Wynn MA, CCC-SLP  05/22/2017

## 2017-05-22 NOTE — PT/OT/SLP EVAL
Physical Therapy  Evaluation/Treatment    Priya Knapp   MRN: [id number]   Admitting Diagnosis: Cerebrovascular accident (CVA) due to occlusion of right carotid artery    PT Received On: 17  PT Start Time: 1023     PT Stop Time: 1046    PT Total Time (min): 23 min       Billable Minutes:  Evaluation 15 and Therapeutic Activity 8    Diagnosis: Cerebrovascular accident (CVA) due to occlusion of right carotid artery; s/p tPA; s/p thrombectomy    Past Medical History:   Diagnosis Date    Hiatal hernia     Polymyalgia rheumatica       Past Surgical History:   Procedure Laterality Date    BACK SURGERY      CARPAL TUNNEL RELEASE Bilateral     HYSTERECTOMY      TOTAL KNEE ARTHROPLASTY Bilateral      Referring physician: Shaina  Date referred to PT: 2017    General Precautions: Standard, aspiration, fall    Do you have any cultural, spiritual, Jain conflicts, given your current situation?: None noted    Patient History:  Lives With: alone  Living Arrangements: house  Transportation Available: family or friend will provide  Living Environment Comment: Pt lives alone in [facility] with x4 TACO with rail available. PTA Pt was (I) with all mobility and ADLs; pt's son live 5-6 blocks away and was available to assist pt as needed. Pt used and owns no DME. Pt with single fall in the home x~6 months ago per son; she fell down the front stairs. Pt enjoys spending time outside and cooking.  Equipment Currently Used at Home: none  DME owned (not currently used): none    Previous Level of Function:  Ambulation Skills: independent  Transfer Skills: independent  ADL Skills: independent  Work/Leisure Activity: independent    Subjective:  Communicated with RN (Osiel) prior to session. EOB clearance provided by [facility] PA (Christina Chinchilla)    Chief Complaint: None stated  Patient goals: None stated    Pain Ratin/10 (pt restless throughout session)   Pain Rating Post-Intervention: 0/10 (pt restless throughout  session)    Objective:   Patient found with: arterial line, blood pressure cuff, putnam catheter, pulse ox (continuous), SCD, telemetry, peripheral IV     Cognitive Exam:  Oriented to: Person; daily orientation provided    Follows Commands/attention: Follows one-step commands inconsistenly  Communication: dysarthria; delayed and selective responses  Safety awareness/insight to disability: impaired    Physical Exam:  Postural examination/scapula alignment: Rounded shoulder, Head forward and Posterior pelvic tilt    Skin integrity: Bruising of B UE, Thin and Dry  Edema: Mild L UE    Sensation: Impaired to L hemibody    Upper Extremity Range of Motion:  Right Upper Extremity: WNL  Left Upper Extremity: WNL    Upper Extremity Strength:  Right Upper Extremity: WFL  Left Upper Extremity: Deficits: noted withdrawal to movement of UE; absence of purposeful movement to command    Lower Extremity Range of Motion:  Right Lower Extremity: WNL  Left Lower Extremity: WNL    Lower Extremity Strength:  Right Lower Extremity: WFL  Left Lower Extremity: Deficits: noted extensor tone with withdrawal to assisted movement of L LE    Gross motor coordination: Impaired    Functional Mobility:  Bed Mobility:  Rolling/Turning to Left: Maximum assistance  Rolling/Turning Right: Maximum assistance  Scooting/Bridging: Total Assistance, With assist of 2    Balance: CARLEY this date    Therapeutic Activities and Exercises:  PT arrived to pt's room to find pt restless and fidgeting with blanket/sheet. PT performed bed-level assessment 2/2 pt not appropriate for EOB activity given work of breathing, restless nature, and decreased consciousness. Pt with strong R gaze preference; able to correct with assist, but no maintain.    PT performed gentle ROM to L UE/LE in all available planes x5 reps. Education provided to pt's family regarding positioning of L UE/L LE and need for support of L UE during bed mobility and in supine to decrease edema. L UE  elevated on wedge. Education provided regarding progression with Rehab service, pending OT Evaluation. RN aware of pt's status and mobility needs. OT informed of pt's current status.    AM-PAC 6 CLICK MOBILITY  How much help from another person does this patient currently need?   1 = Unable, Total/Dependent Assistance  2 = A lot, Maximum/Moderate Assistance  3 = A little, Minimum/Contact Guard/Supervision  4 = None, Modified [place]/Independent    Turning over in bed (including adjusting bedclothes, sheets and blankets)?: 2  Sitting down on and standing up from a chair with arms (e.g., wheelchair, bedside commode, etc.): 2  Moving from lying on back to sitting on the side of the bed?: 2  Moving to and from a bed to a chair (including a wheelchair)?: 2  Need to walk in hospital room?: 1  Climbing 3-5 steps with a railing?: 1  Total Score: 10     AM-PAC Raw Score CMS G-Code Modifier Level of Impairment Assistance   6 % Total / Unable   7 - 9 CM 80 - 100% Maximal Assist   10 - 14 CL 60 - 80% Moderate Assist   15 - 19 CK 40 - 60% Moderate Assist   20 - 22 CJ 20 - 40% Minimal Assist   23 CI 1-20% SBA / CGA   24 CH 0% Independent/ Mod I     Patient left HOB elevated to 30 degrees with all lines intact, call button in reach and RN notified.    Assessment:   Priya Knapp is a 82 y.o. female with a medical diagnosis of Cerebrovascular accident (CVA) due to occlusion of right carotid artery; s/p tPA; s/p thrombectomy. PTA pt was (I) with mobility, ADLs and living alone. Upon evaluation, pt presents with L hemiparesis, impaired functional mobility, R gaze preference, impaired proprioception/sensation, and decreased assistance. Pt not appropriate for EOB activity this date. Recommendations pending EOB/OOB activity. Will progress as tolerated. Patient will benefit from continued PT services to address:    Rehab identified problem list/impairments: Rehab identified problem list/impairments: weakness, impaired  endurance, impaired sensation, gait instability, impaired functional mobilty, impaired self care skills, impaired balance, visual deficits, impaired cognition, decreased lower extremity function, decreased upper extremity function, decreased coordination, decreased safety awareness, abnormal tone, impaired fine motor    Rehab potential is good.    Activity tolerance: Good    Discharge recommendations: Discharge Facility/Level Of Care Needs: rehabilitation facility     Barriers to discharge: Barriers to Discharge: Decreased caregiver support, Inaccessible home environment    Equipment recommendations: Equipment Needed After Discharge:  (TBD next level of care)     GOALS:    Physical Therapy Goals        Problem: Physical Therapy Goal    Goal Priority Disciplines Outcome Goal Variances Interventions   Physical Therapy Goal     PT/OT, PT Ongoing (interventions implemented as appropriate)     Description:  Goals to be met by: 2017     Patient will increase functional independence with mobility by performin. Supine to sit with Maximum Assistance  2. Sit to supine with Maximum Assistance  3. Sit to stand transfer with Maximum Assistance  4. Bed to chair transfer with Maximum Assistance using AD or NO AD  5. Gait x10 feet with Total Assistance using AD or No AD  6. Sitting at edge of bed x10 minutes with Maximum Assistance  7. Stand for x2 minutes with Maximum Assistance using AD or NO AD  8. Lower extremity exercise program x15 reps per handout, with assistance as needed                    PLAN:    Patient to be seen 6 x/week to address the above listed problems via gait training, therapeutic activities, therapeutic exercises, neuromuscular re-education  Plan of Care expires: 17  Plan of Care reviewed with: patient, family     Kasandraalyssa Arnold, PT, DPT  [phone]  2017

## 2017-05-22 NOTE — PLAN OF CARE
Problem: SLP Goal  Goal: SLP Goal  Goals:  1.  Participate in ongoing assessment of swallow   2.  Participate in speech language eval  Outcome: Ongoing (interventions implemented as appropriate)  Recommend that pt be npo with aspiration precautions.  Mary Wynn MA/[facility]-SLP  Speech Language Pathologist  Pager [phone]  5/22/2017    .

## 2017-05-23 PROBLEM — R13.10 DYSPHAGIA: Status: ACTIVE | Noted: 2017-05-23

## 2017-05-23 LAB
AMPHETAMINES SERPL QL: NEGATIVE
ANION GAP SERPL CALC-SCNC: 9 MMOL/L
BARBITURATES SERPL QL SCN: NEGATIVE
BENZODIAZ SERPL QL: NEGATIVE
BUN SERPL-MCNC: 16 MG/DL
CALCIUM SERPL-MCNC: 8.3 MG/DL
CANNABINOIDS SERPL QL: NEGATIVE
CHLORIDE SERPL-SCNC: 115 MMOL/L
CO2 SERPL-SCNC: 17 MMOL/L
COCAINE, BLOOD: NEGATIVE
CREAT SERPL-MCNC: 0.9 MG/DL
EST. GFR  (AFRICAN AMERICAN): >60 ML/MIN/1.73 M^2
EST. GFR  (NON AFRICAN AMERICAN): 59.7 ML/MIN/1.73 M^2
ETHANOL SERPL-MCNC: NEGATIVE MG/DL
FIBRINOGEN PPP-MCNC: 362 MG/DL
GLUCOSE SERPL-MCNC: 115 MG/DL
HCT VFR BLD AUTO: 23.4 %
HCT VFR BLD AUTO: 23.4 %
HCT VFR BLD AUTO: 23.9 %
HCT VFR BLD AUTO: 24.8 %
HCT VFR BLD AUTO: 25.2 %
HGB BLD-MCNC: 7 G/DL
HGB BLD-MCNC: 7.1 G/DL
HGB BLD-MCNC: 7.1 G/DL
HGB BLD-MCNC: 7.5 G/DL
INR PPP: 1
MAGNESIUM SERPL-MCNC: 2.4 MG/DL
METHADONE SERPL QL SCN: NEGATIVE
OPIATES SERPL QL SCN: NEGATIVE
PCP SERPL QL SCN: NEGATIVE
POCT GLUCOSE: 117 MG/DL (ref 70–110)
POCT GLUCOSE: 119 MG/DL (ref 70–110)
POCT GLUCOSE: 126 MG/DL (ref 70–110)
POCT GLUCOSE: 138 MG/DL (ref 70–110)
POTASSIUM SERPL-SCNC: 3.9 MMOL/L
PROPOXYPH SERPL QL: NEGATIVE
PROTHROMBIN TIME: 11.1 SEC
SODIUM SERPL-SCNC: 141 MMOL/L

## 2017-05-23 PROCEDURE — 20000000 HC ICU ROOM

## 2017-05-23 PROCEDURE — 92523 SPEECH SOUND LANG COMPREHEN: CPT

## 2017-05-23 PROCEDURE — 99233 SBSQ HOSP IP/OBS HIGH 50: CPT | Mod: ,,, | Performed by: PHYSICIAN ASSISTANT

## 2017-05-23 PROCEDURE — 25000003 PHARM REV CODE 250: Performed by: PHYSICIAN ASSISTANT

## 2017-05-23 PROCEDURE — 83735 ASSAY OF MAGNESIUM: CPT

## 2017-05-23 PROCEDURE — 92526 ORAL FUNCTION THERAPY: CPT

## 2017-05-23 PROCEDURE — 85018 HEMOGLOBIN: CPT

## 2017-05-23 PROCEDURE — 63600175 PHARM REV CODE 636 W HCPCS

## 2017-05-23 PROCEDURE — C9113 INJ PANTOPRAZOLE SODIUM, VIA: HCPCS | Performed by: PHYSICIAN ASSISTANT

## 2017-05-23 PROCEDURE — 63600175 PHARM REV CODE 636 W HCPCS: Performed by: STUDENT IN AN ORGANIZED HEALTH CARE EDUCATION/TRAINING PROGRAM

## 2017-05-23 PROCEDURE — 99233 SBSQ HOSP IP/OBS HIGH 50: CPT | Mod: ,,, | Performed by: PSYCHIATRY & NEUROLOGY

## 2017-05-23 PROCEDURE — 85384 FIBRINOGEN ACTIVITY: CPT

## 2017-05-23 PROCEDURE — 63600175 PHARM REV CODE 636 W HCPCS: Performed by: PHYSICIAN ASSISTANT

## 2017-05-23 PROCEDURE — 85610 PROTHROMBIN TIME: CPT

## 2017-05-23 PROCEDURE — 25500020 PHARM REV CODE 255: Performed by: PSYCHIATRY & NEUROLOGY

## 2017-05-23 PROCEDURE — 85014 HEMATOCRIT: CPT | Mod: 91

## 2017-05-23 PROCEDURE — 27000221 HC OXYGEN, UP TO 24 HOURS

## 2017-05-23 PROCEDURE — 97110 THERAPEUTIC EXERCISES: CPT

## 2017-05-23 PROCEDURE — 85018 HEMOGLOBIN: CPT | Mod: 91

## 2017-05-23 PROCEDURE — 99233 SBSQ HOSP IP/OBS HIGH 50: CPT | Mod: GC,,, | Performed by: NEUROLOGICAL SURGERY

## 2017-05-23 PROCEDURE — 97530 THERAPEUTIC ACTIVITIES: CPT

## 2017-05-23 PROCEDURE — 80048 BASIC METABOLIC PNL TOTAL CA: CPT

## 2017-05-23 RX ORDER — MIDAZOLAM HYDROCHLORIDE 1 MG/ML
INJECTION INTRAMUSCULAR; INTRAVENOUS
Status: COMPLETED
Start: 2017-05-23 | End: 2017-05-23

## 2017-05-23 RX ORDER — MIDAZOLAM HYDROCHLORIDE 1 MG/ML
2 INJECTION INTRAMUSCULAR; INTRAVENOUS
Status: DISCONTINUED | OUTPATIENT
Start: 2017-05-23 | End: 2017-05-25

## 2017-05-23 RX ADMIN — PANTOPRAZOLE SODIUM 40 MG: 40 INJECTION, POWDER, FOR SOLUTION INTRAVENOUS at 09:05

## 2017-05-23 RX ADMIN — POTASSIUM CHLORIDE 10 MEQ: 10 INJECTION, SOLUTION INTRAVENOUS at 07:05

## 2017-05-23 RX ADMIN — POTASSIUM CHLORIDE 10 MEQ: 10 INJECTION, SOLUTION INTRAVENOUS at 03:05

## 2017-05-23 RX ADMIN — HYDRALAZINE HYDROCHLORIDE 10 MG: 20 INJECTION INTRAMUSCULAR; INTRAVENOUS at 03:05

## 2017-05-23 RX ADMIN — IOHEXOL 100 ML: 350 INJECTION, SOLUTION INTRAVENOUS at 01:05

## 2017-05-23 RX ADMIN — HYDRALAZINE HYDROCHLORIDE 10 MG: 20 INJECTION INTRAMUSCULAR; INTRAVENOUS at 07:05

## 2017-05-23 RX ADMIN — POTASSIUM CHLORIDE 10 MEQ: 10 INJECTION, SOLUTION INTRAVENOUS at 01:05

## 2017-05-23 RX ADMIN — SODIUM CHLORIDE, PRESERVATIVE FREE 3 ML: 5 INJECTION INTRAVENOUS at 01:05

## 2017-05-23 RX ADMIN — SODIUM CHLORIDE, PRESERVATIVE FREE 3 ML: 5 INJECTION INTRAVENOUS at 09:05

## 2017-05-23 RX ADMIN — MIDAZOLAM HYDROCHLORIDE 2 MG: 1 INJECTION INTRAMUSCULAR; INTRAVENOUS at 10:05

## 2017-05-23 RX ADMIN — HYDRALAZINE HYDROCHLORIDE 10 MG: 20 INJECTION INTRAMUSCULAR; INTRAVENOUS at 08:05

## 2017-05-23 RX ADMIN — POTASSIUM CHLORIDE 10 MEQ: 10 INJECTION, SOLUTION INTRAVENOUS at 02:05

## 2017-05-23 RX ADMIN — MIDAZOLAM HYDROCHLORIDE 2 MG: 1 INJECTION, SOLUTION INTRAMUSCULAR; INTRAVENOUS at 10:05

## 2017-05-23 RX ADMIN — POTASSIUM CHLORIDE 10 MEQ: 10 INJECTION, SOLUTION INTRAVENOUS at 04:05

## 2017-05-23 RX ADMIN — PANTOPRAZOLE SODIUM 40 MG: 40 INJECTION, POWDER, FOR SOLUTION INTRAVENOUS at 08:05

## 2017-05-23 NOTE — PLAN OF CARE
Problem: SLP Goal  Goal: SLP Goal  Goals:  1.  Participate in ongoing assessment of swallow   2.  Participate in speech language eval    Pt still at risk for aspiration and not appropriate for po feedings at time.     CECY Perez, CCC-SLP  5/23/2017

## 2017-05-23 NOTE — PROGRESS NOTES
Progress Note  Neurosurgery    Admit Date: 5/21/2017  Post-operative Day:    Hospital Day: 3    SUBJECTIVE:     Priya Knapp is a 82 y.o. female Right MCA stoke, s/p iv tPA and interventional thrombectomy.   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  Follow-up For:  * No surgery found *    No AE. AFVSS    Scheduled Meds:   atorvastatin  40 mg Per NG tube Daily    lisinopril  20 mg Per NG tube Daily    pantoprazole  40 mg Intravenous BID    senna-docusate 8.6-50 mg  1 tablet Per NG tube BID    sodium chloride 0.9%  3 mL Intravenous Q8H     Continuous Infusions:   sodium chloride 0.9% Stopped (05/23/17 1346)     PRN Meds:acetaminophen, dextrose 50%, glucagon (human recombinant), hydrALAZINE, insulin aspart, magnesium sulfate IVPB, magnesium sulfate IVPB, midazolam (PF), ondansetron, potassium chloride, potassium chloride, potassium chloride, sodium phosphate IVPB, sodium phosphate IVPB, sodium phosphate IVPB    Review of patient's allergies indicates:   Allergen Reactions    Codeine     Darvon [propoxyphene]     Macrodantin [nitrofurantoin macrocrystal]     Penicillins     Pneumococcal 23-erika ps vaccine     Talwin [pentazocine lactate]        OBJECTIVE:     Vital Signs (Most Recent)  Temp: 98.1 °F (36.7 °C) (05/23/17 1101)  Pulse: 96 (05/23/17 1400)  Resp: 14 (05/23/17 1400)  BP: (!) 132/59 (05/23/17 1400)  SpO2: 99 % (05/23/17 1400)    Vital Signs Range (Last 24H):  Temp:  [98 °F (36.7 °C)-99 °F (37.2 °C)]   Pulse:  []   Resp:  [14-42]   BP: (124-151)/(58-80)   SpO2:  [96 %-100 %]   Arterial Line BP: (126-184)/(40-66)     I & O (Last 24H):    Intake/Output Summary (Last 24 hours) at 05/23/17 1657  Last data filed at 05/23/17 1346   Gross per 24 hour   Intake           2132.5 ml   Output              150 ml   Net           1982.5 ml     Physical Exam:  PERRL  Speech dysarthric.   Opens eyes to stim, drowsy but arousable. Follows commands thumbs up R.   E3V5M6.   Left  hemiparesis  ASA: 3  Mallampati: 2    Lines/Drains:       Peripheral IV - Single Lumen 05/21/17 1452 Left Forearm (Active)   Site Assessment Clean;Dry;Intact;No redness;No swelling 5/22/2017  3:03 AM   Line Status Flushed;Infusing 5/22/2017  3:03 AM   Dressing Status Clean;Dry;Intact 5/22/2017  3:03 AM   Dressing Intervention Dressing reinforced 5/22/2017  3:03 AM   Dressing Change Due 05/25/17 5/22/2017  3:03 AM   Reason Not Rotated Not due 5/22/2017  3:03 AM   Number of days: 0            Peripheral IV - Single Lumen 05/21/17 0000 Right Forearm (Active)   Site Assessment Clean;Dry;Intact;No redness;No swelling 5/22/2017  3:03 AM   Line Status Flushed;Infusing 5/22/2017  3:03 AM   Dressing Status Clean;Dry;Intact 5/22/2017  3:03 AM   Dressing Intervention Dressing reinforced 5/22/2017  3:03 AM   Dressing Change Due 05/25/17 5/22/2017  3:03 AM   Reason Not Rotated Not due 5/22/2017  3:03 AM   Number of days: 1            Peripheral IV - Single Lumen 05/21/17 0000 Right Forearm (Active)   Site Assessment Clean;Dry;Intact 5/22/2017  3:03 AM   Line Status Flushed;Saline locked 5/22/2017  3:03 AM   Dressing Status Clean;Dry;Intact 5/22/2017  3:03 AM   Dressing Intervention Dressing reinforced 5/22/2017  3:03 AM   Dressing Change Due 05/26/17 5/22/2017  3:03 AM   Reason Not Rotated Not due 5/22/2017  3:03 AM   Number of days: 1            Peripheral IV - Single Lumen 05/21/17 0000 Left Forearm (Active)   Site Assessment Clean;Dry;Intact;No redness;No swelling 5/22/2017  3:03 AM   Line Status Flushed;Saline locked 5/22/2017  3:03 AM   Dressing Status Clean;Dry;Intact 5/22/2017  3:03 AM   Dressing Intervention Dressing reinforced 5/22/2017  3:03 AM   Dressing Change Due 05/25/17 5/22/2017  3:03 AM   Reason Not Rotated Not due 5/22/2017  3:03 AM   Number of days: 1       Wound/Incision:    Laboratory:  CBC:   Recent Labs  Lab 05/22/17 2006 05/23/17  1147   WBC 7.50  --   --    RBC 3.29*  --   --    HGB 7.4*  < > 7.5*    HCT 24.5*  < > 24.8*     --   --    MCV 75*  --   --    MCH 22.5*  --   --    MCHC 30.2*  --   --    < > = values in this interval not displayed.  BMP:     Recent Labs  Lab 05/23/17  0820   *      K 3.9   *   CO2 17*   BUN 16   CREATININE 0.9   CALCIUM 8.3*   MG 2.4     CMP:   Recent Labs  Lab 05/21/17  1454  05/23/17  0820   *  < > 115*   CALCIUM 8.7  < > 8.3*   ALBUMIN 3.9  --   --    PROT 6.7  --   --      < > 141   K 3.7  < > 3.9   CO2 20*  < > 17*     < > 115*   BUN 15  < > 16   CREATININE 1.0  < > 0.9   ALKPHOS 69  --   --    ALT 26  --   --    AST 42*  --   --    BILITOT 0.9  --   --    < > = values in this interval not displayed.  LFTs:     Recent Labs  Lab 05/21/17  1454   ALT 26   AST 42*   ALKPHOS 69   BILITOT 0.9   PROT 6.7   ALBUMIN 3.9     Coagulation:   Recent Labs  Lab 05/21/17  2236  05/23/17  0200   INR 1.1  < > 1.0   APTT 21.5  --   --    < > = values in this interval not displayed.  Cardiac markers:     Recent Labs  Lab 05/22/17  1312   TROPONINI 1.625*     ABGs:     Recent Labs  Lab 05/21/17  2144   PH 7.436   PCO2 27.2*   PO2 75*   HCO3 18.3*   POCSATURATED 96   BE -6     Microbiology Results (last 7 days)     ** No results found for the last 168 hours. **        Specimen (12h ago through future)    None          Recent Labs  Lab 05/21/17  1826   COLORU Yellow   SPECGRAV >1.030*   PHUR 5.0   PROTEINUA Negative   BACTERIA Few*   NITRITE Negative   LEUKOCYTESUR Negative   UROBILINOGEN Negative       Diagnostic Results:  CT head stable. Clearing of contrast.     ASSESSMENT/PLAN:     Assessment: 81 y/o F R MCA stroke s/p IV tPa, attempted thrombectomy TICI 1 PSD#2    Neuro stable.   q 1 hour neuro checks  HOB >30degrees, neck midline.   Medical management per NCC/Stroke  Will follow closely  Pt poor surgical candidate, hemicrani not indicated at this time.

## 2017-05-23 NOTE — PT/OT/SLP PROGRESS
"Physical Therapy  Treatment    Priya Knapp   MRN: [id number]   Admitting Diagnosis: Embolic stroke involving right middle cerebral artery; s/p thrombectomy and tPA    PT Received On: 17  PT Start Time: 955     PT Stop Time:     PT Total Time (min): 27 min       Billable Minutes:  Therapeutic Activity 10 and Therapeutic Exercise 15    Treatment Type: Treatment  PT/PTA: PT           General Precautions: Standard, aspiration, fall, NPO    Subjective:  Communicated with RN (Osiel) prior to session. MD Sergio Graf) cleared pt for EOB sitting.    "Hello, yes I worked with Monse. It was good. I had some water." pt with eyes close 90% of session; only opens eyes with max VCs for pt to attend to task    Pain Ratin/10  Pain Rating Post-Intervention: 0/10 (pt remains restless throughout session)    Objective:   Patient found with: arterial line, blood pressure cuff, pulse ox (continuous), SCD, telemetry, oxygen, peripheral IV    Functional Mobility:  Bed Mobility:   Rolling/Turning to Left: Maximum assistance  Rolling/Turning Right: Maximum assistance  Scooting/Bridging: Maximum Assistance  Supine to Sit:  (Pt not appropriate for EOB sitting this date; increased RR, decreased arousal, inability to consistenly folllow commands, elevated BP. Pending repeat CT at 1030.)    Balance: CARLEY    Therapeutic Activities and Exercises:  PT arrived to pt's room to find pt resting quietly; fidgeting with sheet. PT informed family that pt approved for EOB activity, however, may not be appropriate given HR, RR and BP elevated; pt restless and with eyes close. Pt assisted to midline 2/2 L lateral lean. Pt's L UE drawn up into flexed positioning req frequent repositioning and inhibiation to maintain extended position. Pt's VS improved with repositioning of L LE and L UE to midline and upright posture.    PT performed PROM to L UE/LE and AAROM to R LE/UE in all available planes of motion through full available ROM x12 repetitions with " VCs for participation from patient to attend to task. PT instructing pt on breathing exercises to improve RR and HR. Pt's RR decreased from ~40 to ~20. Pt asleep upon conclusion of session with L UE supported on x2 pillows and L LE relaxed in extension on mattress surface. Family updated on pt's performance. RN aware of pt's status. Questions/concerns addressed within PT scope of practice.     AM-PAC 6 CLICK MOBILITY  How much help from another person does this patient currently need?   1 = Unable, Total/Dependent Assistance  2 = A lot, Maximum/Moderate Assistance  3 = A little, Minimum/Contact Guard/Supervision  4 = None, Modified [place]/Independent    Turning over in bed (including adjusting bedclothes, sheets and blankets)?: 2  Sitting down on and standing up from a chair with arms (e.g., wheelchair, bedside commode, etc.): 2  Moving from lying on back to sitting on the side of the bed?: 2  Moving to and from a bed to a chair (including a wheelchair)?: 1  Need to walk in hospital room?: 1  Climbing 3-5 steps with a railing?: 1  Total Score: 9    AM-PAC Raw Score CMS G-Code Modifier Level of Impairment Assistance   6 % Total / Unable   7 - 9 CM 80 - 100% Maximal Assist   10 - 14 CL 60 - 80% Moderate Assist   15 - 19 CK 40 - 60% Moderate Assist   20 - 22 CJ 20 - 40% Minimal Assist   23 CI 1-20% SBA / CGA   24 CH 0% Independent/ Mod I     Patient left HOB elevated with all lines intact, call button in reach and RN notified.    Assessment:  Priya Knapp is a 82 y.o. female with a medical diagnosis of Embolic stroke involving right middle cerebral artery and presents with fair participation with PT this date; EOB clearance obtained, however, pt not appropriate for EOB given above presentation. Pt tolerated exercises with improve VS and decreased tone/stiffness to L UE/L LE. Will progress as tolerated to EOB/OOB activity. Pt appropriate for x1 discipline at this time. Patient will benefit from continued  PT services to address:    Rehab identified problem list/impairments: Rehab identified problem list/impairments: weakness, impaired endurance, impaired sensation, gait instability, impaired functional mobilty, impaired self care skills, impaired balance, visual deficits, impaired cognition, decreased lower extremity function, decreased upper extremity function, decreased safety awareness, decreased coordination, abnormal tone, impaired fine motor, edema, impaired cardiopulmonary response to activity, impaired skin    Rehab potential is good.    Activity tolerance: Good    Discharge recommendations: Discharge Facility/Level Of Care Needs:  (TBD pending EOB/OOB activity and OT evaluation)     Barriers to discharge: Barriers to Discharge: Inaccessible home environment, Decreased caregiver support    Equipment recommendations: Equipment Needed After Discharge:  (TBD next level of care)     GOALS:    Physical Therapy Goals        Problem: Physical Therapy Goal    Goal Priority Disciplines Outcome Goal Variances Interventions   Physical Therapy Goal     PT/OT, PT Ongoing (interventions implemented as appropriate)     Description:  Goals to be met by: 2017     Patient will increase functional independence with mobility by performin. Supine to sit with Maximum Assistance  2. Sit to supine with Maximum Assistance  3. Sit to stand transfer with Maximum Assistance  4. Bed to chair transfer with Maximum Assistance using AD or NO AD  5. Gait x10 feet with Total Assistance using AD or No AD  6. Sitting at edge of bed x10 minutes with Maximum Assistance  7. Stand for x2 minutes with Maximum Assistance using AD or NO AD  8. Lower extremity exercise program x15 reps per handout, with assistance as needed                  PLAN:    Patient to be seen 6 x/week  to address the above listed problems via gait training, therapeutic activities, therapeutic exercises, neuromuscular re-education  Plan of Care expires:  06/21/17  Plan of Care reviewed with: patient, family     Kasandra Arnold, PT, DPT  [phone]  5/23/2017

## 2017-05-23 NOTE — ASSESSMENT & PLAN NOTE
-- History of GI bleed - admitted to OSH for symptomatic anemia. Hb on admit at OSH was 6.8 > received 2U PRBC  -- EGD at OSH negative for source  -- H/H here dropped from 9.3/30.8 > 7.8/25.5  -- type and screen  -- no clinical source of bleeding  -- GI consulted  -- protonix BID  -- monitor H/H q6h

## 2017-05-23 NOTE — PLAN OF CARE
Problem: Physical Therapy Goal  Goal: Physical Therapy Goal  Goals to be met by: 2017     Patient will increase functional independence with mobility by performin. Supine to sit with Maximum Assistance  2. Sit to supine with Maximum Assistance  3. Sit to stand transfer with Maximum Assistance  4. Bed to chair transfer with Maximum Assistance using AD or NO AD  5. Gait x10 feet with Total Assistance using AD or No AD  6. Sitting at edge of bed x10 minutes with Maximum Assistance  7. Stand for x2 minutes with Maximum Assistance using AD or NO AD  8. Lower extremity exercise program x15 reps per handout, with assistance as needed   Outcome: Ongoing (interventions implemented as appropriate)    Goals updated and appropriate to reflect pt's current mobility needs.    Kasandra Arnold, PT, DPT  [phone]  2017

## 2017-05-23 NOTE — PROGRESS NOTES
Anemia of unclear etiology  Ct abdomen wo contrast  Stool occult blood  Check fibrinogen level  Will gauge timing and immediate for transfusion

## 2017-05-23 NOTE — NURSING
Pt taken to X ray for NGT placement.  RN and MD made multiple attempts unsuccessfully.  Pt became tachycardic, tachypnic, and hypertensive. Pt given hydralazine per order for hypertension.  Pts VS remained elevated.  No further attempts made.  NCC team notified.  MD cited pt's hiatal hernia/stomach location as seen on recent abdominal CT as potential reason for unsuccessful attempts.  Upon return to pt's room, VSS.  Will continue to monitor.

## 2017-05-23 NOTE — SUBJECTIVE & OBJECTIVE
Interval History:  No events overnight. Repeat CT head with mild increased mass effect, neurologically stable. H/H stable    Review of Systems   Respiratory: Negative for shortness of breath.    Cardiovascular: Negative for chest pain.   Neurological: Positive for facial asymmetry and weakness.     Objective:     Vitals:  Temp: 98.1 °F (36.7 °C) (05/23/17 1101)  Pulse: 96 (05/23/17 1400)  Resp: 14 (05/23/17 1400)  BP: (!) 132/59 (05/23/17 1400)  SpO2: 99 % (05/23/17 1400)    Temp:  [98 °F (36.7 °C)-99.4 °F (37.4 °C)] 98.1 °F (36.7 °C)  Pulse:  [] 96  Resp:  [14-42] 14  SpO2:  [94 %-100 %] 99 %  BP: (124-151)/(58-80) 132/59  Arterial Line BP: ()/(40-66) 152/52        05/22 0701 - 05/23 0700  In: 2380.8 [I.V.:2380.8]  Out: 250 [Urine:250]    Physical Exam    GA: Alert, restless. No acute distress  HEENT: No scleral icterus or JVD.   Pulmonary: Clear to auscultation A/L. No wheezing, crackles, or rhonchi.  Cardiac: RRR S1 & S2 w systolic murmur.   Abdominal: Bowel sounds present x 4. No appreciable hepatosplenomegaly.  Skin: No jaundice, rashes, or visible lesions.     Neuro:  --sedation: none  --GCS: E4V5M6  --Mental Status: awake and alert. Follows commands. Severe dysarthria  --CN II-XII: left lower facial weakness. Right gaze preference, unable to look fully left though can cross midline. Left hemianopsia  --Pupils 3mm, PERRL.   --brainstem: deferred  --Motor: LUE with limited movement. Moves bilateral lower extremities (R>L) and RUE spontaneously.   --sensory: left yuan-hypoesthesia  --Gait: deferred        [place]ai Coma Scale      Medications:  Continuous    sodium chloride 0.9% Last Rate: Stopped (05/23/17 1346)   Scheduled    atorvastatin 40 mg Daily   lisinopril 20 mg Daily   pantoprazole 40 mg BID   senna-docusate 8.6-50 mg 1 tablet BID   sodium chloride 0.9% 3 mL Q8H   PRN    acetaminophen 650 mg Q6H PRN   dextrose 50% 12.5 g PRN   glucagon (human recombinant) 1 mg PRN   hydrALAZINE 10 mg Q4H PRN    insulin aspart 0-5 Units Q6H PRN   magnesium sulfate IVPB 2 g PRN   magnesium sulfate IVPB 4 g PRN   midazolam (PF) 2 mg On Call Procedure   ondansetron 4 mg Q12H PRN   potassium chloride 10 mEq PRN   potassium chloride 10 mEq PRN   potassium chloride 10 mEq PRN   sodium phosphate IVPB 15 mmol PRN   sodium phosphate IVPB 20.01 mmol PRN   sodium phosphate IVPB 30 mmol PRN     Today I personally reviewed pertinent medications, imaging, lab results,

## 2017-05-23 NOTE — PROGRESS NOTES
NG tube insertion attempted and was unsuccessful. [employer] notified. No PO meds due at this time. No new orders. Will continue to monitor.

## 2017-05-23 NOTE — PROGRESS NOTES
[facility]  Neurocritical Care  Progress Note    Admit Date: 5/21/2017  Service Date: 05/23/2017  Length of Stay: 2    Subjective:     Chief Complaint: Embolic stroke involving right middle cerebral artery    History of Present Illness: Mrs. Knapp is an 82 year old woman with a history of PUD, hiatal hernia and polymyalgia rheumatica who was transferred from [facility] for management of stroke. She was admitted there for a GI bleed, however a scope showed no evidence of active bleeding. Around 8:20am on 5/21 she developed sudden onset of left sided weakness and right gaze preference. CTA at the outside facility showed a possible R ICA occlusion. She received tPA at 10:05am and was transferred here for possible thrombectomy.    Symptoms have not improved significantly since onset. CT head on arrival showed a small area of hypoattenuation. The decision was made to take her to IR for thrombectomy.     Hospital Course: 5/22 CT head with large infarct.   5/23 H/H stable. CT abdomen ordered to evaluate for other source of bleed      Interval History:  No events overnight. Repeat CT head with mild increased mass effect, neurologically stable. H/H stable    Review of Systems   Respiratory: Negative for shortness of breath.    Cardiovascular: Negative for chest pain.   Neurological: Positive for facial asymmetry and weakness.     Objective:     Vitals:  Temp: 98.1 °F (36.7 °C) (05/23/17 1101)  Pulse: 96 (05/23/17 1400)  Resp: 14 (05/23/17 1400)  BP: (!) 132/59 (05/23/17 1400)  SpO2: 99 % (05/23/17 1400)    Temp:  [98 °F (36.7 °C)-99.4 °F (37.4 °C)] 98.1 °F (36.7 °C)  Pulse:  [] 96  Resp:  [14-42] 14  SpO2:  [94 %-100 %] 99 %  BP: (124-151)/(58-80) 132/59  Arterial Line BP: ()/(40-66) 152/52        05/22 0701 - 05/23 0700  In: 2380.8 [I.V.:2380.8]  Out: 250 [Urine:250]    Physical Exam    GA: Alert, restless. No acute distress  HEENT: No scleral icterus or JVD.   Pulmonary: Clear to  auscultation A/L. No wheezing, crackles, or rhonchi.  Cardiac: RRR S1 & S2 w systolic murmur.   Abdominal: Bowel sounds present x 4. No appreciable hepatosplenomegaly.  Skin: No jaundice, rashes, or visible lesions.     Neuro:  --sedation: none  --GCS: E4V5M6  --Mental Status: awake and alert. Follows commands. Severe dysarthria  --CN II-XII: left lower facial weakness. Right gaze preference, unable to look fully left though can cross midline. Left hemianopsia  --Pupils 3mm, PERRL.   --brainstem: deferred  --Motor: LUE with limited movement. Moves bilateral lower extremities (R>L) and RUE spontaneously.   --sensory: left yuan-hypoesthesia  --Gait: deferred        [place] Coma Scale      Medications:  Continuous    sodium chloride 0.9% Last Rate: Stopped (05/23/17 1346)   Scheduled    atorvastatin 40 mg Daily   lisinopril 20 mg Daily   pantoprazole 40 mg BID   senna-docusate 8.6-50 mg 1 tablet BID   sodium chloride 0.9% 3 mL Q8H   PRN    acetaminophen 650 mg Q6H PRN   dextrose 50% 12.5 g PRN   glucagon (human recombinant) 1 mg PRN   hydrALAZINE 10 mg Q4H PRN   insulin aspart 0-5 Units Q6H PRN   magnesium sulfate IVPB 2 g PRN   magnesium sulfate IVPB 4 g PRN   midazolam (PF) 2 mg On Call Procedure   ondansetron 4 mg Q12H PRN   potassium chloride 10 mEq PRN   potassium chloride 10 mEq PRN   potassium chloride 10 mEq PRN   sodium phosphate IVPB 15 mmol PRN   sodium phosphate IVPB 20.01 mmol PRN   sodium phosphate IVPB 30 mmol PRN     Today I personally reviewed pertinent medications, imaging, lab results,     Assessment/Plan:     Neuro   * Embolic stroke involving right middle cerebral artery    -- s/p tPA - started @ 1005 5/21  -- taken to IR for thrombectomy - TICI 1  -- SBP goal <140  -- q1h neuro checks  -- Echo with EF 74%  -- MRI without contrast completed - large R MCA territory infarct  -- hold antiplatelets and DVT ppx  -- PT/OT/SLP  -- Vascular neurology following        Cytotoxic cerebral edema    --  consistent with ischemic stroke  -- see plan as above        Dysarthria    -- due to stroke  -- SLP consult        Flaccid hemiplegia affecting left nondominant side    -- due to stroke  -- see plan as above  -- PT/OT        Cardiac   Elevated troponin    -- from 0.708 > 2.767 > 1.625  -- EKG with sinus tach        Fluids/Electrolytes/Nutrition/GI   Dysphagia    -- likely due to stroke  -- unable to place NG tube at bedside  -- will attempt with fluoro        Other   Acute blood loss anemia    -- History of GI bleed - admitted to OSH for symptomatic anemia. Hb on admit at OSH was 6.8 > received 2U PRBC  -- EGD at OSH negative for source  -- H/H here dropped from 9.3/30.8 > 7.8/25.5  -- type and screen  -- no clinical source of bleeding  -- GI consulted  -- protonix BID  -- monitor H/H q6h        Received tissue plasminogen activator (t-PA) less than 24 hours prior to arrival    -- started 5/21 at 10:05 am        Left homonymous hemianopsia    -- due to stroke  -- see plan as above            Prophylaxis:  Venous Thromboembolism: mechanical  Stress Ulcer: PPI  Ventilator Pneumonia: not applicable     Activity Orders          None        DNR    Regina Howard MD  Neurocritical Care  [facility]

## 2017-05-23 NOTE — PT/OT/SLP PROGRESS
Occupational Therapy      Priya Knapp  MRN: [id number]    Patient not seen today secondary to Other (Comment) (Pt continues to have elevated HR, PT performed ROM and suggested pt is only appropriate for one discipline at this time. ). Will follow-up.    JACKY Malik  5/23/2017

## 2017-05-23 NOTE — PLAN OF CARE
Problem: Patient Care Overview  Goal: Plan of Care Review  Outcome: Ongoing (interventions implemented as appropriate)  POC reviewed with pt and family at 1400. Pt verbalized understanding. Questions and concerns addressed.  Pt progressing toward goals. Will continue to monitor. See flowsheets for full assessment and VS info.

## 2017-05-23 NOTE — PLAN OF CARE
Problem: Patient Care Overview  Goal: Plan of Care Review  Outcome: Ongoing (interventions implemented as appropriate)  No acute events throughout the night, VS and assessment per flow sheet, patient progressing towards goal as tolerated. Patient taken for CT scan. Plan of care reviewed with Priya Knapp and family, all concerns addressed. Will continue to monitor.

## 2017-05-23 NOTE — PT/OT/SLP EVAL
"Speech Language Pathology Evaluation    Priya Knapp   MRN: [id number]   Admitting Diagnosis: Embolic stroke involving right middle cerebral artery    Diet recommendations: Solid Diet Level: NPO  Liquid Diet Level: NPO     SLP Treatment Date: 17  Speech Start Time: 845     Speech Stop Time: 910     Speech Total (min): 25 min       TREATMENT BILLABLE MINUTES:  Eval 17  and Treatment Swallowing Dysfunction 8    Diagnosis: Embolic stroke involving right middle cerebral artery      Past Medical History:   Diagnosis Date    Hiatal hernia     Polymyalgia rheumatica      Past Surgical History:   Procedure Laterality Date    BACK SURGERY      CARPAL TUNNEL RELEASE Bilateral     HYSTERECTOMY      TOTAL KNEE ARTHROPLASTY Bilateral        Has the patient been evaluated by SLP for swallowing? : Yes  Keep patient NPO?: No   General Precautions: Standard, aspiration, fall, NPO    Current Respiratory Status: nasal cannula     Social Hx: Patient lives alone    Prior diet: regular/thin      Subjective:  " My grandson" when asked to introduce her family  Patient goals: to drink    Pain Ratin/10              Pain Rating Post-Intervention: 0/10    Objective:        Oral Musculature Evaluation  Oral Musculature: left weakness, facial asymmetry present  Dentition: uses dentures to eat, scattered dentition  Mucosal Quality: cracked, dry  Oral Labial Strength and Mobility: impaired retraction, impaired pursing, impaired seal  Lingual Strength and Mobility: impaired strength, impaired depression, impaired protrusion  Velar Elevation: impaired  Buccal Strength and Mobility: impaired  Volitional Cough: fair  Volitional Swallow: not elicited  Voice Prior to PO Intake: hoarse     Cognitive Status:  Behavioral Observations: Pt with eyes closed and delays in responding -  Memory and Orientation: oriented to person, place, month and year. She was able to name family members in the room  Attention: decreased  Problem Solving: " responses to simple task were accurate but concrete. She was able to complete concrete categorizational task with 100% acc./ She contrasted items but did not compared th em  Pragmatics: poor eye contact  Executive Function: mental flexibility    Language: yes    Auditory Comprehension: pt responded to complex y/n questions with 100% acc and followed 1-2 step commands    Verbal Expression: Pt counted to 5 with therapist. She completed automatic sentences and responsive speech questions with 100% acc. She named objects with 43% acc and with cues 100% acc. Unsure if vision played a role in decreased naming. Objects were held on right side    Motor Speech: dyarthria; moderate    Voice: hoarse with decreased intensity    Augmentative Alternative Communication: no    Reading: to be tested    Writing: to be tested    Visual-Spatial: left neglect. Pt unable to attend to therapist on the left. Further eval warranted    Additional Treatment:    Pt given teaspoon of water x1 with immediate coughing and choking for several seconds. Pt was given tsp of pureed thick water. Pt had difficulty taking bolus from spoon. When placed in oral cavity, pt had difficulty with oral manipulation and attempted to spit out the bolus. No swallow response triggered and oral cavity suctioned to remove the bolus. Pt remains at high risk for aspiration and should remain npo at this time. White board updated and all questions answered from family.     FIM:  Social Interaction: 3 Moderate Direction--The patient interacts appropriately 50 to 74% of the time.   Problem Solving: 3 Moderate Direction--The patient solves routine problems 50 to 74% of the time.   Comprehension: 3 Moderate Prompting--The patient understands directions and conversation about basid daily needs 50 to 74% of the time.   Expression: 3 Moderate Prompting--The patient expresses basic daily needs and ideas 50 to 74% of the time.   Memory: 3 Moderate Prompting--The patient recognizes  and remembers 50 to 74% of the time.     Assessment:  Priya Knapp is a 82 y.o. female with a SLP diagnosis of Dysphagia, Dysarthria, Cognitive-Linguistic Impairment and Visual-Spatial Impairment. She present with progress towards goals.     Do you have any cultural, spiritual, Anglican conflicts, given your current situation?: no    Discharge recommendations: Rehab    Goals:    SLP Goals        Problem: SLP Goal    Goal Priority Disciplines Outcome   SLP Goal     SLP Ongoing (interventions implemented as appropriate)   Description:  Goals to be met 5/30  1.  Participate in ongoing assessment of swallow   2.  Pt will complete OME x5 with min a to improve rom,function, strength  3 Pt will participate in further eval of reading/writing and visual spatial skills   4 Pt will complete dysarthria task with 60% intelligibility and mod a   5. Pt will complete mental manipulation task with 80% acc and min a                     Plan:   Patient to be seen Therapy Frequency: 5 x/week   Plan of Care expires: 06/20/17  Plan of Care reviewed with: patient, family  SLP Follow-up?: Yes  SLP - Next Visit Date: 05/23/17           CECY Perez, CCC-SLP  05/23/2017

## 2017-05-24 LAB
ANION GAP SERPL CALC-SCNC: 10 MMOL/L
ANISOCYTOSIS BLD QL SMEAR: SLIGHT
BASOPHILS # BLD AUTO: 0.02 K/UL
BASOPHILS NFR BLD: 0.3 %
BLD PROD TYP BPU: NORMAL
BLOOD UNIT EXPIRATION DATE: NORMAL
BLOOD UNIT TYPE CODE: 600
BLOOD UNIT TYPE: NORMAL
BUN SERPL-MCNC: 17 MG/DL
CALCIUM SERPL-MCNC: 8.1 MG/DL
CHLORIDE SERPL-SCNC: 116 MMOL/L
CO2 SERPL-SCNC: 16 MMOL/L
CODING SYSTEM: NORMAL
CREAT SERPL-MCNC: 1 MG/DL
DIFFERENTIAL METHOD: ABNORMAL
DISPENSE STATUS: NORMAL
EOSINOPHIL # BLD AUTO: 0 K/UL
EOSINOPHIL NFR BLD: 0.5 %
ERYTHROCYTE [DISTWIDTH] IN BLOOD BY AUTOMATED COUNT: 23.2 %
EST. GFR  (AFRICAN AMERICAN): >60 ML/MIN/1.73 M^2
EST. GFR  (NON AFRICAN AMERICAN): 52.6 ML/MIN/1.73 M^2
GLUCOSE SERPL-MCNC: 97 MG/DL
HCT VFR BLD AUTO: 21.8 %
HCT VFR BLD AUTO: 23.4 %
HCT VFR BLD AUTO: 24.5 %
HCT VFR BLD AUTO: 27.9 %
HGB BLD-MCNC: 6.5 G/DL
HGB BLD-MCNC: 7.1 G/DL
HGB BLD-MCNC: 7.3 G/DL
HGB BLD-MCNC: 8.3 G/DL
HYPOCHROMIA BLD QL SMEAR: ABNORMAL
INR PPP: 1
LYMPHOCYTES # BLD AUTO: 0.9 K/UL
LYMPHOCYTES NFR BLD: 11.3 %
MAGNESIUM SERPL-MCNC: 2.1 MG/DL
MCH RBC QN AUTO: 23.2 PG
MCHC RBC AUTO-ENTMCNC: 30.3 %
MCV RBC AUTO: 77 FL
MONOCYTES # BLD AUTO: 0.7 K/UL
MONOCYTES NFR BLD: 9.1 %
NEUTROPHILS # BLD AUTO: 6.2 K/UL
NEUTROPHILS NFR BLD: 78.4 %
OVALOCYTES BLD QL SMEAR: ABNORMAL
PHOSPHATE SERPL-MCNC: 2.5 MG/DL
PLATELET # BLD AUTO: 189 K/UL
PLATELET BLD QL SMEAR: ABNORMAL
PMV BLD AUTO: 11.5 FL
POCT GLUCOSE: 107 MG/DL (ref 70–110)
POCT GLUCOSE: 112 MG/DL (ref 70–110)
POIKILOCYTOSIS BLD QL SMEAR: SLIGHT
POLYCHROMASIA BLD QL SMEAR: ABNORMAL
POTASSIUM SERPL-SCNC: 4.1 MMOL/L
PROTHROMBIN TIME: 11.1 SEC
RBC # BLD AUTO: 3.06 M/UL
SODIUM SERPL-SCNC: 142 MMOL/L
TRANS ERYTHROCYTES VOL PATIENT: NORMAL ML
WBC # BLD AUTO: 7.87 K/UL

## 2017-05-24 PROCEDURE — 25000003 PHARM REV CODE 250: Performed by: PHYSICIAN ASSISTANT

## 2017-05-24 PROCEDURE — 97110 THERAPEUTIC EXERCISES: CPT

## 2017-05-24 PROCEDURE — 99233 SBSQ HOSP IP/OBS HIGH 50: CPT | Mod: GC,,, | Performed by: NEUROLOGICAL SURGERY

## 2017-05-24 PROCEDURE — 85610 PROTHROMBIN TIME: CPT

## 2017-05-24 PROCEDURE — 63600175 PHARM REV CODE 636 W HCPCS

## 2017-05-24 PROCEDURE — 83735 ASSAY OF MAGNESIUM: CPT

## 2017-05-24 PROCEDURE — 92526 ORAL FUNCTION THERAPY: CPT

## 2017-05-24 PROCEDURE — 84100 ASSAY OF PHOSPHORUS: CPT

## 2017-05-24 PROCEDURE — 86900 BLOOD TYPING SEROLOGIC ABO: CPT

## 2017-05-24 PROCEDURE — 27000221 HC OXYGEN, UP TO 24 HOURS

## 2017-05-24 PROCEDURE — 20000000 HC ICU ROOM

## 2017-05-24 PROCEDURE — P9021 RED BLOOD CELLS UNIT: HCPCS

## 2017-05-24 PROCEDURE — 63600175 PHARM REV CODE 636 W HCPCS: Performed by: PHYSICIAN ASSISTANT

## 2017-05-24 PROCEDURE — C9113 INJ PANTOPRAZOLE SODIUM, VIA: HCPCS | Performed by: PHYSICIAN ASSISTANT

## 2017-05-24 PROCEDURE — 99291 CRITICAL CARE FIRST HOUR: CPT | Mod: ,,, | Performed by: PSYCHIATRY & NEUROLOGY

## 2017-05-24 PROCEDURE — 25000003 PHARM REV CODE 250: Performed by: STUDENT IN AN ORGANIZED HEALTH CARE EDUCATION/TRAINING PROGRAM

## 2017-05-24 PROCEDURE — 86901 BLOOD TYPING SEROLOGIC RH(D): CPT

## 2017-05-24 PROCEDURE — 92507 TX SP LANG VOICE COMM INDIV: CPT

## 2017-05-24 PROCEDURE — 85025 COMPLETE CBC W/AUTO DIFF WBC: CPT

## 2017-05-24 PROCEDURE — 63600175 PHARM REV CODE 636 W HCPCS: Performed by: STUDENT IN AN ORGANIZED HEALTH CARE EDUCATION/TRAINING PROGRAM

## 2017-05-24 PROCEDURE — 80048 BASIC METABOLIC PNL TOTAL CA: CPT

## 2017-05-24 PROCEDURE — 85014 HEMATOCRIT: CPT | Mod: 91

## 2017-05-24 PROCEDURE — 99233 SBSQ HOSP IP/OBS HIGH 50: CPT | Mod: ,,, | Performed by: PSYCHIATRY & NEUROLOGY

## 2017-05-24 PROCEDURE — 85018 HEMOGLOBIN: CPT | Mod: 91

## 2017-05-24 RX ORDER — HYDRALAZINE HYDROCHLORIDE 20 MG/ML
10 INJECTION INTRAMUSCULAR; INTRAVENOUS EVERY 4 HOURS PRN
Status: DISCONTINUED | OUTPATIENT
Start: 2017-05-24 | End: 2017-05-25

## 2017-05-24 RX ORDER — FENTANYL CITRATE 50 UG/ML
12.5 INJECTION, SOLUTION INTRAMUSCULAR; INTRAVENOUS
Status: DISCONTINUED | OUTPATIENT
Start: 2017-05-24 | End: 2017-05-25

## 2017-05-24 RX ORDER — MANNITOL 250 MG/ML
INJECTION, SOLUTION INTRAVENOUS
Status: COMPLETED
Start: 2017-05-24 | End: 2017-05-24

## 2017-05-24 RX ORDER — MANNITOL 250 MG/ML
25 INJECTION, SOLUTION INTRAVENOUS EVERY 6 HOURS
Status: COMPLETED | OUTPATIENT
Start: 2017-05-24 | End: 2017-05-25

## 2017-05-24 RX ORDER — MANNITOL 250 MG/ML
25 INJECTION, SOLUTION INTRAVENOUS ONCE
Status: DISCONTINUED | OUTPATIENT
Start: 2017-05-24 | End: 2017-05-24

## 2017-05-24 RX ORDER — HYDROCODONE BITARTRATE AND ACETAMINOPHEN 500; 5 MG/1; MG/1
TABLET ORAL
Status: DISCONTINUED | OUTPATIENT
Start: 2017-05-24 | End: 2017-05-25

## 2017-05-24 RX ADMIN — PANTOPRAZOLE SODIUM 40 MG: 40 INJECTION, POWDER, FOR SOLUTION INTRAVENOUS at 09:05

## 2017-05-24 RX ADMIN — MANNITOL 25 G: 12.5 INJECTION, SOLUTION INTRAVENOUS at 05:05

## 2017-05-24 RX ADMIN — FENTANYL CITRATE 12.5 MCG: 50 INJECTION INTRAMUSCULAR; INTRAVENOUS at 08:05

## 2017-05-24 RX ADMIN — HYDRALAZINE HYDROCHLORIDE 10 MG: 20 INJECTION INTRAMUSCULAR; INTRAVENOUS at 12:05

## 2017-05-24 RX ADMIN — SODIUM CHLORIDE, PRESERVATIVE FREE 3 ML: 5 INJECTION INTRAVENOUS at 09:05

## 2017-05-24 RX ADMIN — MANNITOL 25 G: 12.5 INJECTION, SOLUTION INTRAVENOUS at 11:05

## 2017-05-24 RX ADMIN — STANDARDIZED SENNA CONCENTRATE AND DOCUSATE SODIUM 1 TABLET: 8.6; 5 TABLET, FILM COATED ORAL at 09:05

## 2017-05-24 RX ADMIN — PANTOPRAZOLE SODIUM 40 MG: 40 INJECTION, POWDER, FOR SOLUTION INTRAVENOUS at 08:05

## 2017-05-24 RX ADMIN — HYDRALAZINE HYDROCHLORIDE 10 MG: 20 INJECTION INTRAMUSCULAR; INTRAVENOUS at 07:05

## 2017-05-24 RX ADMIN — FENTANYL CITRATE 12.5 MCG: 50 INJECTION INTRAMUSCULAR; INTRAVENOUS at 05:05

## 2017-05-24 RX ADMIN — FENTANYL CITRATE 12.5 MCG: 50 INJECTION INTRAMUSCULAR; INTRAVENOUS at 10:05

## 2017-05-24 NOTE — ASSESSMENT & PLAN NOTE
Patient was at an outside facility when she developed sudden onset of left weakness and right gaze preference concerning for an ischemic stroke. She received tPA (5/21) and was transferred here for intervention. She was taken to IR for thrombectomy of right ICA/M1 occlusion, but was unsuccessful with TICI 1. Neuroimaging shows a large territory right MCA stroke, with some mideline shift. Family relayed the patient's wishes of DNR/DNI, and are aware that the patient's condition could get worse. Etiology unclear at this time - echo unremarkable with EF 74%.    -- PT/OT/SLP  -- Vascular neurology following  -- SBP goal <140  -- q1h neuro checks  -- hold antiplatelets and DVT ppx  -- mannitol 25g q6h x3 for cerebral edema

## 2017-05-24 NOTE — SUBJECTIVE & OBJECTIVE
Interval History: No events overnight. They were unable to place NGT by fluoro yesterday. She remains lethargic but oriented. We discussed our expectations with the family, and explained that she may get worse clinically. We explained that she's had a large stroke, which could compromise her breathing. They are aware of this, and agree with her wishes of DNR/DNI however they would like everything medical to be done.     Review of Systems   Respiratory: Negative for shortness of breath.    Cardiovascular: Negative for chest pain.   Neurological: Positive for facial asymmetry and weakness.     Objective:     Vitals:  Temp: 99.2 °F (37.3 °C) (05/24/17 1115)  Pulse: 106 (05/24/17 1300)  Resp: 18 (05/24/17 1300)  BP: (!) 153/76 (05/24/17 1230)  SpO2: 100 % (05/24/17 1300)    Temp:  [98.2 °F (36.8 °C)-99.9 °F (37.7 °C)] 99.2 °F (37.3 °C)  Pulse:  [] 106  Resp:  [13-52] 18  SpO2:  [100 %] 100 %  BP: ()/(59-84) 153/76  Arterial Line BP: ()/(41-87) 97/87        05/23 0701 - 05/24 0700  In: 2081.3 [I.V.:1481.3]  Out: 760 [Urine:760]    Physical Exam    GA: Alert, restless. No acute distress  HEENT: No scleral icterus or JVD.   Pulmonary: Clear to auscultation A/L. No wheezing, crackles, or rhonchi.  Cardiac: RRR S1 & S2 w systolic murmur.   Abdominal: Bowel sounds present x 4. No appreciable hepatosplenomegaly.  Skin: No jaundice, rashes, or visible lesions.     Neuro:  --sedation: none  --GCS: E4V5M6  --Mental Status: Drowsy, awake and oriented. Follows commands. Severe dysarthria  --CN II-XII: left lower facial weakness. Right gaze preference, unable to look fully left though can cross midline. Left hemianopsia  --Pupils 3mm, PERRL.   --brainstem: deferred  --Motor: LUE with limited movement. Moves bilateral lower extremities (R>L) and RUE spontaneously.   --sensory: left yuan-hypoesthesia  --Gait: deferred        Anai Coma Scale    Medications:  Continuous    sodium chloride 0.9% Last Rate: 75 mL/hr at  05/24/17 1300   Scheduled    atorvastatin 40 mg Daily   lisinopril 20 mg Daily   mannitol 25% 25 g Q6H   pantoprazole 40 mg BID   senna-docusate 8.6-50 mg 1 tablet BID   sodium chloride 0.9% 3 mL Q8H   PRN    sodium chloride  Q24H PRN   acetaminophen 650 mg Q6H PRN   dextrose 50% 12.5 g PRN   fentaNYL 12.5 mcg Q2H PRN   glucagon (human recombinant) 1 mg PRN   hydrALAZINE 10 mg Q4H PRN   insulin aspart 0-5 Units Q6H PRN   magnesium sulfate IVPB 2 g PRN   magnesium sulfate IVPB 4 g PRN   midazolam (PF) 2 mg On Call Procedure   ondansetron 4 mg Q12H PRN   potassium chloride 10 mEq PRN   potassium chloride 10 mEq PRN   potassium chloride 10 mEq PRN   sodium phosphate IVPB 15 mmol PRN   sodium phosphate IVPB 20.01 mmol PRN   sodium phosphate IVPB 30 mmol PRN     Today I personally reviewed pertinent medications, imaging, lab results,

## 2017-05-24 NOTE — ASSESSMENT & PLAN NOTE
Priya Knapp is a 82 y.o. female with R MCA treated with tPA and thrombectomy    Holding antiplatelets due to drop in h&h, considering transfusion   Atorvastatin 40  SBP <180 post tPA  PT/OT and speech to evaluate and treat  VTE ppx with SCDs, holding VTE ppx due to drop in h&h  Neuro checks q1h

## 2017-05-24 NOTE — PT/OT/SLP PROGRESS
"Physical Therapy  Treatment    Priya Knapp   MRN: [id number]   Admitting Diagnosis: Embolic stroke involving right middle cerebral artery    PT Received On: 17  PT Start Time: 1505     PT Stop Time: 1517    PT Total Time (min): 12 min       Billable Minutes:  Therapeutic Exercise 10    Treatment Type: Treatment  PT/PTA: PT           General Precautions: Standard, aspiration, fall, NPO, seizure    Subjective:  Communicated with RN (Elisa) prior to session. PT attempting previously; RN's placing NG tube.    "Okay, marlin Slaughter." pt states "I didn't know you."    Pain Ratin/10  Pain Rating Post-Intervention: 0/10    Objective:   Patient found with: arterial line, blood pressure cuff, SCD, peripheral IV, oxygen, telemetry (purewick)    Functional Mobility:  Bed Mobility: Did not assess    Therapeutic Activities and Exercises:  PT arrived to pt's room to find pt resting quietly following NG tube insertion. Pt agreeable to repositioning and exercises; however, upon PT's attempts to mobilize L UE and L LE, pt with increased restlessness and increased VS readings (HR, RR, BP) with increased work of breathing noted. PT attempting to perform mobility slowly and with full explanation; however, pt unable to participate and with increased restlessness noted.  PT addressed significant L UE swelling with  PROM in all available planes of motion through full available ROM x15 repetitions with VCs for participation from patient and repositioning in neutral. Unable to perform ROM to L LE 2/2 flexed posture and resistance to all movement. RN aware of pt's status and mobility needs. Questions/concerns addressed within PT scope of practice. No family present to provide education.    AM-PAC 6 CLICK MOBILITY  How much help from another person does this patient currently need?   1 = Unable, Total/Dependent Assistance  2 = A lot, Maximum/Moderate Assistance  3 = A little, Minimum/Contact Guard/Supervision  4 = None, Modified " [place]/Independent    Turning over in bed (including adjusting bedclothes, sheets and blankets)?: 2  Sitting down on and standing up from a chair with arms (e.g., wheelchair, bedside commode, etc.): 2  Moving from lying on back to sitting on the side of the bed?: 2  Moving to and from a bed to a chair (including a wheelchair)?: 1  Need to walk in hospital room?: 1  Climbing 3-5 steps with a railing?: 1  Total Score: 9    AM-PAC Raw Score CMS G-Code Modifier Level of Impairment Assistance   6 % Total / Unable   7 - 9 CM 80 - 100% Maximal Assist   10 - 14 CL 60 - 80% Moderate Assist   15 - 19 CK 40 - 60% Moderate Assist   20 - 22 CJ 20 - 40% Minimal Assist   23 CI 1-20% SBA / CGA   24 CH 0% Independent/ Mod I     Patient left HOB elevated with all lines intact, call button in reach and RN notified.    Assessment:  Priya Knapp is a 82 y.o. female with a medical diagnosis of Embolic stroke involving right middle cerebral artery and presents with fair participation with PT this PM. Pt with severe R gaze preference, L hemiparesis, and decreased activity tolerance. Pt remains inappropriate for EOB activity at this time 2/2 cerebral edema and decreased tolerance for mobilization/stimulation. Will progress as tolerated. Patient will benefit from continued PT services to address:    Rehab identified problem list/impairments: Rehab identified problem list/impairments: weakness, impaired endurance, impaired sensation, impaired self care skills, visual deficits, gait instability, impaired functional mobilty, impaired cognition, decreased coordination, decreased upper extremity function, decreased lower extremity function, impaired balance, decreased safety awareness, edema, impaired skin    Rehab potential is good.    Activity tolerance: Good    Discharge recommendations: Discharge Facility/Level Of Care Needs:  (TBD pending EOB/OOB)     Barriers to discharge: Barriers to Discharge: Inaccessible home environment,  Decreased caregiver support    Equipment recommendations: Equipment Needed After Discharge:  (TBD next level of care)     GOALS:    Physical Therapy Goals        Problem: Physical Therapy Goal    Goal Priority Disciplines Outcome Goal Variances Interventions   Physical Therapy Goal     PT/OT, PT Ongoing (interventions implemented as appropriate)     Description:  Goals to be met by: 2017     Patient will increase functional independence with mobility by performin. Supine to sit with Maximum Assistance  2. Sit to supine with Maximum Assistance  3. Sit to stand transfer with Maximum Assistance  4. Bed to chair transfer with Maximum Assistance using AD or NO AD  5. Gait x10 feet with Total Assistance using AD or No AD  6. Sitting at edge of bed x10 minutes with Maximum Assistance  7. Stand for x2 minutes with Maximum Assistance using AD or NO AD  8. Lower extremity exercise program x15 reps per handout, with assistance as needed                  PLAN:    Patient to be seen 6 x/week  to address the above listed problems via gait training, therapeutic activities, therapeutic exercises, neuromuscular re-education  Plan of Care expires: 17  Plan of Care reviewed with: patient     Kasandra Arnold, PT, DPT  [phone]  2017

## 2017-05-24 NOTE — PROGRESS NOTES
[facility]  Neurocritical Care  Progress Note    Admit Date: 5/21/2017  Service Date: 05/24/2017  Length of Stay: 3    Subjective:     Chief Complaint: Embolic stroke involving right middle cerebral artery    History of Present Illness: Mrs. Knapp is an 82 year old woman with a history of PUD, hiatal hernia and polymyalgia rheumatica who was transferred from [facility] for management of stroke. She was admitted there for a GI bleed, however a scope showed no evidence of active bleeding. Around 8:20am on 5/21 she developed sudden onset of left sided weakness and right gaze preference. CTA at the outside facility showed a possible R ICA occlusion. She received tPA at 10:05am and was transferred here for possible thrombectomy.    Symptoms have not improved significantly since onset. CT head on arrival showed a small area of hypoattenuation. The decision was made to take her to IR for thrombectomy.     Hospital Course: 5/22 CT head with large infarct.   5/23 H/H stable. CT abdomen ordered to evaluate for other source of bleed  5/24 Unable to place NG tube with fluoro.     Interval History: No events overnight. They were unable to place NGT by fluoro yesterday. She remains lethargic but oriented. We discussed our expectations with the family, and explained that she may get worse clinically. We explained that she's had a large stroke, which could compromise her breathing. They are aware of this, and agree with her wishes of DNR/DNI however they would like everything medical to be done.     Review of Systems   Respiratory: Negative for shortness of breath.    Cardiovascular: Negative for chest pain.   Neurological: Positive for facial asymmetry and weakness.     Objective:     Vitals:  Temp: 99.2 °F (37.3 °C) (05/24/17 1115)  Pulse: 106 (05/24/17 1300)  Resp: 18 (05/24/17 1300)  BP: (!) 153/76 (05/24/17 1230)  SpO2: 100 % (05/24/17 1300)    Temp:  [98.2 °F (36.8 °C)-99.9 °F (37.7 °C)] 99.2 °F  (37.3 °C)  Pulse:  [] 106  Resp:  [13-52] 18  SpO2:  [100 %] 100 %  BP: ()/(59-84) 153/76  Arterial Line BP: ()/(41-87) 97/87        05/23 0701 - 05/24 0700  In: 2081.3 [I.V.:1481.3]  Out: 760 [Urine:760]    Physical Exam    GA: Alert, restless. No acute distress  HEENT: No scleral icterus or JVD.   Pulmonary: Clear to auscultation A/L. No wheezing, crackles, or rhonchi.  Cardiac: RRR S1 & S2 w systolic murmur.   Abdominal: Bowel sounds present x 4. No appreciable hepatosplenomegaly.  Skin: No jaundice, rashes, or visible lesions.     Neuro:  --sedation: none  --GCS: E4V5M6  --Mental Status: Drowsy, awake and oriented. Follows commands. Severe dysarthria  --CN II-XII: left lower facial weakness. Right gaze preference, unable to look fully left though can cross midline. Left hemianopsia  --Pupils 3mm, PERRL.   --brainstem: deferred  --Motor: LUE with limited movement. Moves bilateral lower extremities (R>L) and RUE spontaneously.   --sensory: left yuan-hypoesthesia  --Gait: deferred        Anai Coma Scale    Medications:  Continuous    sodium chloride 0.9% Last Rate: 75 mL/hr at 05/24/17 1300   Scheduled    atorvastatin 40 mg Daily   lisinopril 20 mg Daily   mannitol 25% 25 g Q6H   pantoprazole 40 mg BID   senna-docusate 8.6-50 mg 1 tablet BID   sodium chloride 0.9% 3 mL Q8H   PRN    sodium chloride  Q24H PRN   acetaminophen 650 mg Q6H PRN   dextrose 50% 12.5 g PRN   fentaNYL 12.5 mcg Q2H PRN   glucagon (human recombinant) 1 mg PRN   hydrALAZINE 10 mg Q4H PRN   insulin aspart 0-5 Units Q6H PRN   magnesium sulfate IVPB 2 g PRN   magnesium sulfate IVPB 4 g PRN   midazolam (PF) 2 mg On Call Procedure   ondansetron 4 mg Q12H PRN   potassium chloride 10 mEq PRN   potassium chloride 10 mEq PRN   potassium chloride 10 mEq PRN   sodium phosphate IVPB 15 mmol PRN   sodium phosphate IVPB 20.01 mmol PRN   sodium phosphate IVPB 30 mmol PRN     Today I personally reviewed pertinent medications, imaging, lab  results,     Assessment/Plan:     Neuro   * Embolic stroke involving right middle cerebral artery    Patient was at an outside facility when she developed sudden onset of left weakness and right gaze preference concerning for an ischemic stroke. She received tPA (5/21) and was transferred here for intervention. She was taken to IR for thrombectomy of right ICA/M1 occlusion, but was unsuccessful with TICI 1. Neuroimaging shows a large territory right MCA stroke, with some mideline shift. Family relayed the patient's wishes of DNR/DNI, and are aware that the patient's condition could get worse. Etiology unclear at this time - echo unremarkable with EF 74%.    -- PT/OT/SLP  -- Vascular neurology following  -- SBP goal <140  -- q1h neuro checks  -- hold antiplatelets and DVT ppx  -- mannitol 25g q6h x3 for cerebral edema        Cytotoxic cerebral edema    -- consistent with ischemic stroke  -- see plan as above        Dysarthria    -- due to stroke  -- SLP consult        Flaccid hemiplegia affecting left nondominant side    -- due to stroke  -- see plan as above  -- PT/OT        Cardiac   Elevated troponin    -- from 0.708 > 2.767 > 1.625  -- EKG with sinus tach        Fluids/Electrolytes/Nutrition/GI   Dysphagia    -- likely due to stroke  -- unable to place NG tube        Other   Acute blood loss anemia    -- History of GI bleed - admitted to OSH for symptomatic anemia. Hb on admit at OSH was 6.8 > received 2U PRBC  -- EGD at OSH negative for source  -- H/H here dropped from 9.3/30.8 > 7.8/25.5  -- type and screen  -- no clinical source of bleeding  -- GI consulted  -- protonix BID  -- monitor H/H q6h        Received tissue plasminogen activator (t-PA) less than 24 hours prior to arrival    -- started 5/21 at 10:05 am        Left homonymous hemianopsia    -- due to stroke  -- see plan as above            Prophylaxis:  Venous Thromboembolism: mechanical  Stress Ulcer: NA  Ventilator Pneumonia: not applicable      Activity Orders          None        DNR    Regina Howard MD  Neurocritical Care  [facility]

## 2017-05-24 NOTE — PROGRESS NOTES
[facility]  Vascular Neurology  Comprehensive Stroke Center  Progress Note      Assessment/Plan:     Priya Knapp is a 82 y.o. Female with R MCA syndrome treated with tPA and going for thrombectomy. Patient had CTA at OSH. In order to limit the use of contrast in this patient, it was decided to take patient for CT head. There was evidence of some early ischemic change, but decision was made to bring patient to IR. In IR, patient had tortuosity in R ICA with R carotid terminus occlusion and TICI 1 flow.    05/22/17 MRI with large R MCA infarct. Patient restless with continuous movement of right side. 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 female with R MCA treated with tPA and thrombectomy    Holding antiplatelets due to drop in h&h, transfused on 5/24/17  Atorvastatin 40   SBP <180 post tPA  PT/OT and speech to evaluate and treat  VTE ppx with SCDs, holding VTE ppx due to drop in h&h  Neuro checks q1h  Remains DNR/DNI  On mannitol                                           Neurologic Chief Complaint: R MCA    Subjective:     Interval History: Patient is seen for follow-up neurological assessment and treatment recommendations: transfused for low H&H, repeat scan today. Concern for swelling, on mannitol     HPI, Past Medical, Family, and Social History remains the same as documented in the initial  encounter.     Review of Systems   Constitutional: Positive for fatigue. Negative for fever.   HENT: Negative for drooling.    Neurological: Positive for facial asymmetry and weakness. Negative for speech difficulty.   Psychiatric/Behavioral: Negative for agitation and behavioral problems.     Scheduled Meds:   atorvastatin  40 mg Per NG tube Daily    lisinopril  20 mg Per NG tube Daily    mannitol 25%  25 g Intravenous Q6H    pantoprazole  40 mg Intravenous BID    senna-docusate 8.6-50 mg  1 tablet Per NG tube BID    sodium chloride 0.9%  3 mL Intravenous Q8H     Continuous Infusions:   sodium chloride 0.9% 75 mL/hr at 05/24/17 1622     PRN Meds:sodium chloride, acetaminophen, dextrose 50%, fentaNYL, glucagon (human recombinant), hydrALAZINE, insulin aspart, magnesium sulfate IVPB, magnesium sulfate IVPB, midazolam (PF), ondansetron, potassium chloride, potassium chloride, potassium chloride, sodium phosphate IVPB, sodium phosphate IVPB, sodium phosphate IVPB    Objective:     Vital Signs (Most Recent):  Temp: 98.9 °F (37.2 °C) (05/24/17 1500)  Pulse: 104 (05/24/17 1600)  Resp: 17 (05/24/17 1600)  BP: (!) 157/74 (05/24/17 1600)  SpO2: 100 % (05/24/17 1600)  BP Location: Right arm    Vital Signs Range (Last 24H):  Temp:  [98.9 °F (37.2 °C)-99.9 °F (37.7 °C)]   Pulse:  []   Resp:  [13-52]   BP: ()/(59-84)   SpO2:  [100 %]   Arterial Line BP: ()/()   BP Location: Right arm    Physical Exam   Constitutional: She appears well-developed and well-nourished. No distress.   Fatigue    HENT:   Head: Normocephalic and atraumatic.   Eyes: Pupils are equal, round, and reactive to light.   Cardiovascular: Tachycardia present.    Pulmonary/Chest: Effort normal. No respiratory distress.   Neurological: She is alert.   Skin: Skin is warm and dry.   Psychiatric: She has a normal mood and affect.   Vitals reviewed.      Neurological Exam:   LOC: follows requests and drowsy  Speech:  Dysarthria  Orientation: Person, Place, Time  Visual Fields (CN II): Hemianopsia  left  EOM (CN III, IV, VI): Gaze preference right  Facial Movement (CN VII): lower weakness left lower  Motor*: Arm Left:  Plegic (0/5), Leg Left:   Paretic:  1/5, Arm Right:   Normal (5/5), Leg Right:   Normal (5/5)  Tone: Arm-Left: flaccid; Leg-Left: normal; Arm-Right: normal; Leg-Right: normal  Neglect     NIH Stroke Scale:    Level of Consciousness: 1 - drowsy  LOC Questions: 0 - answers both correctly  LOC Commands: 0 - performs both correctly  Best Gaze: 2 - forced deviation  Visual: 2 - complete hemianopia  Facial Palsy: 1 - minor  Motor Left Arm: 4 - no movement  Motor Right Arm: 0 - no drift  Motor Left Le - no movement  Motor Right Le - no drift  Limb Ataxia: 0 - absent  Sensory: 0 - normal  Best Language: 0 - no aphasia  Dysarthria: 1 - mild to moderate dysarthria  Extinction and Inattention: 2 - complete neglect  NIH Stroke Scale Total: 17      Laboratory:  CMP:     Recent Labs  Lab 17  0250   CALCIUM 8.1*      K 4.1   CO2 16*   *   BUN 17   CREATININE 1.0     CBC:   Recent Labs  Lab 17  0250  17  1717   WBC 7.87  --   --    RBC 3.06*  --   --    HGB 7.1*  < > 8.3*   HCT 23.4*  < > 27.9*     --   --    MCV 77*  --   --    MCH 23.2*  --   --    MCHC 30.3*  --   --    < > = values in this interval not displayed.  Lipid Panel:     Recent Labs  Lab 17  1454   CHOL 187   LDLCALC 117.0   HDL 56   TRIG 70     Coagulation:   Recent Labs  Lab 17  2236  17  0250   INR 1.1  < > 1.0   APTT 21.5  --   --    < > = values in this interval not displayed.  Platelet Aggregation Study: No results for input(s): PLTAGG, PLTAGINTERP, PLTAGREGLACO, ADPPLTAGGREG in the last 168 hours.  Hgb A1C:     Recent Labs  Lab 17  1454   HGBA1C 5.7     TSH:     Recent Labs  Lab 17  1454   TSH 1.409       Diagnostic Results:  I have personally reviewed:     MRI Brain. Date:  05/22/17  Large acute right MCA distribution infarct as above.    Additional small right BRIDGET infarct.    Mild chronic microvascular ischemic changes.    CT Head. Date: 05/22/17  Limited exam given marked motion artifact.    Temporal evolution of large right MCA distribution infarction with less conspicuous focus of high attenuation within the right basal ganglia.  No new hemorrhage or new infarct.  No hydrocephalus.    CT Head. Date: 05/21/17  Interval development of small right basal ganglia hemorrhage versus contrast staining..    Subtle loss of gray-white differentiation and sulcal effacement in the posterior right frontal lobe compatible with evolution of acute right MCA territory infarct.      CTA Head from OSH. Date: 05/21/17  Occlusion of R supraclinoid carotid artery with nonvisualization of the M1 segment, possibly on the basis of intraluminal thrombus. R M2 segment is in part reconstituted via lenticulostriate collaterals. Dominant supply of the R BRIDGET territory via the a comm    Echo. Date: 05/22/17  -EF 73%  -no LAE  -concentric remodeling  -pulmonary HTN    CT head 5/24/17   Evolving large right MCA distribution infarct with slight increase in mass effect as above. No new hemorrhagic conversion, infarct extension, or new territorial infarct.      KIMMY Son  Comprehensive Stroke Center  Department of Vascular Neurology   [facility]

## 2017-05-24 NOTE — PLAN OF CARE
Problem: Physical Therapy Goal  Goal: Physical Therapy Goal  Goals to be met by: 2017     Patient will increase functional independence with mobility by performin. Supine to sit with Maximum Assistance  2. Sit to supine with Maximum Assistance  3. Sit to stand transfer with Maximum Assistance  4. Bed to chair transfer with Maximum Assistance using AD or NO AD  5. Gait x10 feet with Total Assistance using AD or No AD  6. Sitting at edge of bed x10 minutes with Maximum Assistance  7. Stand for x2 minutes with Maximum Assistance using AD or NO AD  8. Lower extremity exercise program x15 reps per handout, with assistance as needed   Outcome: Ongoing (interventions implemented as appropriate)    Goals updated and appropriate to reflect pt's current mobility needs.    Kasandra Arnold, PT, DPT  [phone]  2017

## 2017-05-24 NOTE — PROGRESS NOTES
Pt transported to CT and back via bed on portable monitor and portable O2 by RN x1. Pt tolerated well, returned to [address] [place] [address] incident. NCC notified of return from CT. Pt placed on bedside monitors, alarams set and audible. Placed on wall O2 via [place]

## 2017-05-24 NOTE — PLAN OF CARE
Problem: SLP Goal  Goal: SLP Goal  Goals to be met 5/30  1.  Participate in ongoing assessment of swallow   2.  Pt will complete OME x5 with min a to improve rom,function, strength  3 Pt will participate in further eval of reading/writing and visual spatial skills   4 Pt will complete dysarthria task with 60% intelligibility and mod a   5. Pt will complete mental manipulation task with 80% acc and min a     Pt with continued progress towards goals.    Maggie Lindsay M.A. CCC-SLP  Speech Language Pathologist  [phone]  5/24/2017

## 2017-05-24 NOTE — PT/OT/SLP PROGRESS
Speech Language Pathology  Treatment    Priya Knapp   MRN: [id number]   Admitting Diagnosis: Embolic stroke involving right middle cerebral artery    Diet recommendations: Solid Diet Level: NPO  Liquid Diet Level: NPO     SLP Treatment Date: 17  Speech Start Time: 830     Speech Stop Time: 846     Speech Total (min): 16 min       TREATMENT BILLABLE MINUTES:  Speech Therapy Individual 8 and Treatment Swallowing Dysfunction 8    Has the patient been evaluated by SLP for swallowing? : Yes  Keep patient NPO?: Yes   General Precautions: Standard, aspiration, fall, NPO  Current Respiratory Status: nasal cannula       Subjective:  Pt with lethargy    Pain Ratin/10              Pain Rating Post-Intervention: 0/10    Objective:     pt benefited from mod stimulation to remain alert during entire session. Pt tolerated 2 single ice chips without cough; no change to phonation. Third ice chip attempted and pt with anterior loss. Oral phase cb decreased oral acceptance, slow bolus manipulation with delayed onset of pharyngeal swallow, anterior leak; benefited from mod cues to initiate pharyngeal swallow. Whiteboard updated with diet recommendations/aspiration precautions. Skilled education provided on aspiration precautions and diet recommendations. RN notified of progress with swallow post session.    Pt read aloud 2 sentences with 100% acc indep. Pt identified 2 sentences presented in YN format with 100% acc indep. Pt benefited from excess processing time to complete all tasks. Pt completed visual scanning task x3 with 0% acc provided max cues; successfully established eye contact with clinician on R provided mod cues. Mental manipulation task attempted; deferred 2/2 progressive lethargy. Impaired intelligibility during conversation; dysarthria. No further questions.                             Assessment:  Priya Knapp is a 82 y.o. female with a medical diagnosis of Embolic stroke involving right middle cerebral  artery and presents with dysphagia, cognitive impairment, dysarthria, visual impairment. continued progress towards goals.'      Discharge recommendations: Discharge Facility/Level Of Care Needs: rehabilitation facility     Goals:    SLP Goals        Problem: SLP Goal    Goal Priority Disciplines Outcome   SLP Goal     SLP Ongoing (interventions implemented as appropriate)   Description:  Goals to be met 5/30  1.  Participate in ongoing assessment of swallow   2.  Pt will complete OME x5 with min a to improve rom,function, strength  3 Pt will participate in further eval of reading/writing and visual spatial skills   4 Pt will complete dysarthria task with 60% intelligibility and mod a   5. Pt will complete mental manipulation task with 80% acc and min a                     Plan:   Patient to be seen Therapy Frequency: 5 x/week   Plan of Care expires: 06/20/17  Plan of Care reviewed with: patient  SLP Follow-up?: Yes  SLP - Next Visit Date: 05/25/17          Maggie Lindsay M.A. CCC-SLP  Speech Language Pathologist  [phone]  5/24/2017

## 2017-05-24 NOTE — SUBJECTIVE & OBJECTIVE
Neurologic Chief Complaint: R MCA    Subjective:     Interval History: Patient is seen for follow-up neurological assessment and treatment recommendations: transfused for low H&H, repeat scan today. Concern for swelling, on mannitol     HPI, Past Medical, Family, and Social History remains the same as documented in the initial encounter.     Review of Systems   Constitutional: Positive for fatigue. Negative for fever.   HENT: Negative for drooling.    Neurological: Positive for facial asymmetry and weakness. Negative for speech difficulty.   Psychiatric/Behavioral: Negative for agitation and behavioral problems.     Scheduled Meds:   atorvastatin  40 mg Per NG tube Daily    lisinopril  20 mg Per NG tube Daily    mannitol 25%  25 g Intravenous Q6H    pantoprazole  40 mg Intravenous BID    senna-docusate 8.6-50 mg  1 tablet Per NG tube BID    sodium chloride 0.9%  3 mL Intravenous Q8H     Continuous Infusions:   sodium chloride 0.9% 75 mL/hr at 05/24/17 1622     PRN Meds:sodium chloride, acetaminophen, dextrose 50%, fentaNYL, glucagon (human recombinant), hydrALAZINE, insulin aspart, magnesium sulfate IVPB, magnesium sulfate IVPB, midazolam (PF), ondansetron, potassium chloride, potassium chloride, potassium chloride, sodium phosphate IVPB, sodium phosphate IVPB, sodium phosphate IVPB    Objective:     Vital Signs (Most Recent):  Temp: 98.9 °F (37.2 °C) (05/24/17 1500)  Pulse: 104 (05/24/17 1600)  Resp: 17 (05/24/17 1600)  BP: (!) 157/74 (05/24/17 1600)  SpO2: 100 % (05/24/17 1600)  BP Location: Right arm    Vital Signs Range (Last 24H):  Temp:  [98.9 °F (37.2 °C)-99.9 °F (37.7 °C)]   Pulse:  []   Resp:  [13-52]   BP: ()/(59-84)   SpO2:  [100 %]   Arterial Line BP: ()/()   BP Location: Right arm    Physical Exam   Constitutional: She appears well-developed and well-nourished. No distress.   Fatigue    HENT:   Head: Normocephalic and atraumatic.   Eyes: Pupils are equal, round, and  reactive to light.   Cardiovascular: Tachycardia present.    Pulmonary/Chest: Effort normal. No respiratory distress.   Neurological: She is alert.   Skin: Skin is warm and dry.   Psychiatric: She has a normal mood and affect.   Vitals reviewed.      Neurological Exam:   LOC: follows requests and drowsy  Speech: Dysarthria  Orientation: Person, Place, Time  Visual Fields (CN II): Hemianopsia  left  EOM (CN III, IV, VI): Gaze preference right  Facial Movement (CN VII): lower weakness left lower  Motor*: Arm Left:  Plegic (0/5), Leg Left:   Paretic:  1/5, Arm Right:   Normal (5/5), Leg Right:   Normal (5/5)  Tone: Arm-Left: flaccid; Leg-Left: normal; Arm-Right: normal; Leg-Right: normal  Neglect     NIH Stroke Scale:    Level of Consciousness: 1 - drowsy  LOC Questions: 0 - answers both correctly  LOC Commands: 0 - performs both correctly  Best Gaze: 2 - forced deviation  Visual: 2 - complete hemianopia  Facial Palsy: 1 - minor  Motor Left Arm: 4 - no movement  Motor Right Arm: 0 - no drift  Motor Left Le - no movement  Motor Right Le - no drift  Limb Ataxia: 0 - absent  Sensory: 0 - normal  Best Language: 0 - no aphasia  Dysarthria: 1 - mild to moderate dysarthria  Extinction and Inattention: 2 - complete neglect  NIH Stroke Scale Total: 17      Laboratory:  CMP:     Recent Labs  Lab 17  0250   CALCIUM 8.1*      K 4.1   CO2 16*   *   BUN 17   CREATININE 1.0     CBC:   Recent Labs  Lab 17  02517  1717   WBC 7.87  --   --    RBC 3.06*  --   --    HGB 7.1*  < > 8.3*   HCT 23.4*  < > 27.9*     --   --    MCV 77*  --   --    MCH 23.2*  --   --    MCHC 30.3*  --   --    < > = values in this interval not displayed.  Lipid Panel:     Recent Labs  Lab 17  1454   CHOL 187   LDLCALC 117.0   HDL 56   TRIG 70     Coagulation:   Recent Labs  Lab 17  2236  17  0250   INR 1.1  < > 1.0   APTT 21.5  --   --    < > = values in this interval not displayed.  Platelet  Aggregation Study: No results for input(s): PLTAGG, PLTAGINTERP, PLTAGREGLACO, ADPPLTAGGREG in the last 168 hours.  Hgb A1C:     Recent Labs  Lab 05/21/17  1454   HGBA1C 5.7     TSH:     Recent Labs  Lab 05/21/17  1454   TSH 1.409       Diagnostic Results:  I have personally reviewed:     MRI Brain. Date: 05/22/17  Large acute right MCA distribution infarct as above.    Additional small right BRIDGET infarct.    Mild chronic microvascular ischemic changes.    CT Head. Date: 05/22/17  Limited exam given marked motion artifact.    Temporal evolution of large right MCA distribution infarction with less conspicuous focus of high attenuation within the right basal ganglia.  No new hemorrhage or new infarct.  No hydrocephalus.    CT Head. Date: 05/21/17  Interval development of small right basal ganglia hemorrhage versus contrast staining..    Subtle loss of gray-white differentiation and sulcal effacement in the posterior right frontal lobe compatible with evolution of acute right MCA territory infarct.      CTA Head from OSH. Date: 05/21/17  Occlusion of R supraclinoid carotid artery with nonvisualization of the M1 segment, possibly on the basis of intraluminal thrombus. R M2 segment is in part reconstituted via lenticulostriate collaterals. Dominant supply of the R BRIDGET territory via the a comm    Echo. Date: 05/22/17  -EF 73%  -no LAE  -concentric remodeling  -pulmonary HTN    CT head 5/24/17   Evolving large right MCA distribution infarct with slight increase in mass effect as above. No new hemorrhagic conversion, infarct extension, or new territorial infarct.

## 2017-05-24 NOTE — PROGRESS NOTES
[facility]  Vascular Neurology  Comprehensive Stroke Center  Progress Note      Assessment/Plan:     Priya Knapp is a 82 y.o. Female with R MCA syndrome treated with tPA and going for thrombectomy. Patient had CTA at OSH. In order to limit the use of contrast in this patient, it was decided to take patient for CT head. There was evidence of some early ischemic change, but decision was made to bring patient to IR. In IR, patient had tortuosity in R ICA with R carotid terminus occlusion and TICI 1 flow.    05/22/17 MRI with large R MCA infarct. Patient restless with continuous movement of right side. 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 female with R MCA treated with tPA and thrombectomy    Holding antiplatelets due to drop in h&h, considering transfusion   Atorvastatin 40  SBP <180 post tPA  PT/OT and speech to evaluate and treat  VTE ppx with SCDs, holding VTE ppx due to drop in h&h  Neuro checks q1h                                          Neurologic Chief Complaint: R MCA    Subjective:     Interval History: Patient is seen for follow-up neurological assessment and treatment recommendations: planning for ct abdomen today, considering transfusion if H&H drops further.   Patient needing ng placed under radiology as well today     HPI, Past Medical, Family, and Social History remains the same as documented in the initial encounter.     Review of Systems   Constitutional: Negative for fever.        Restless   HENT: Negative for  drooling.    Neurological: Positive for facial asymmetry and weakness. Negative for speech difficulty.   Psychiatric/Behavioral: Negative for agitation and behavioral problems.     Scheduled Meds:   atorvastatin  40 mg Per NG tube Daily    lisinopril  20 mg Per NG tube Daily    pantoprazole  40 mg Intravenous BID    senna-docusate 8.6-50 mg  1 tablet Per NG tube BID    sodium chloride 0.9%  3 mL Intravenous Q8H     Continuous Infusions:   sodium chloride 0.9% 75 mL/hr (05/23/17 1901)     PRN Meds:acetaminophen, dextrose 50%, glucagon (human recombinant), hydrALAZINE, insulin aspart, magnesium sulfate IVPB, magnesium sulfate IVPB, midazolam (PF), ondansetron, potassium chloride, potassium chloride, potassium chloride, sodium phosphate IVPB, sodium phosphate IVPB, sodium phosphate IVPB    Objective:     Vital Signs (Most Recent):  Temp: 98.9 °F (37.2 °C) (05/23/17 1901)  Pulse: (!) 113 (05/23/17 1901)  Resp: 20 (05/23/17 1901)  BP: (!) 149/60 (05/23/17 1901)  SpO2: 100 % (05/23/17 1901)  BP Location: Right arm    Vital Signs Range (Last 24H):  Temp:  [98 °F (36.7 °C)-99 °F (37.2 °C)]   Pulse:  []   Resp:  [13-42]   BP: (124-151)/(58-80)   SpO2:  [96 %-100 %]   Arterial Line BP: ()/(40-63)   BP Location: Right arm    Physical Exam   Constitutional: She appears well-developed and well-nourished. No distress.   HENT:   Head: Normocephalic and atraumatic.   Eyes: Pupils are equal, round, and reactive to light.   Cardiovascular: Tachycardia present.    Pulmonary/Chest: Effort normal. No respiratory distress.   Neurological: She is alert.   Skin: Skin is warm and dry.   Psychiatric: She has a normal mood and affect. She is hyperactive (continuous movement on R side).   Vitals reviewed.      Neurological Exam:   LOC: follows requests and drowsy  Language: No aphasia  Speech: Dysarthria  Orientation: Person, Place, Time  Memory: Recent memory intact, Remote memory intact, Age correct, Month correct  Visual  Fields (CN II): Hemianopsia  left  EOM (CN III, IV, VI): Gaze preference right  Facial Movement (CN VII): lower weakness left lower  Motor*: Arm Left:  Plegic (0/5), Leg Left:   Paretic:  1/5, Arm Right:   Normal (5/5), Leg Right:   Normal (5/5)  Tone: Arm-Left: flaccid; Leg-Left: normal; Arm-Right: normal; Leg-Right: normal  Neglect     NIH Stroke Scale:    Level of Consciousness: 1 - drowsy  LOC Questions: 0 - answers both correctly  LOC Commands: 0 - performs both correctly  Best Gaze: 2 - forced deviation  Visual: 2 - complete hemianopia  Facial Palsy: 1 - minor  Motor Left Arm: 4 - no movement  Motor Right Arm: 0 - no drift  Motor Left Le - no movement  Motor Right Le - no drift  Limb Ataxia: 0 - absent  Sensory: 0 - normal  Best Language: 0 - no aphasia  Dysarthria: 1 - mild to moderate dysarthria  Extinction and Inattention: 2 - complete neglect  NIH Stroke Scale Total: 17      Laboratory:  CMP:     Recent Labs  Lab 17  0820   CALCIUM 8.3*      K 3.9   CO2 17*   *   BUN 16   CREATININE 0.9     CBC:   Recent Labs  Lab 17  1847   WBC 7.50  --   --    RBC 3.29*  --   --    HGB 7.4*  < > 7.1*   HCT 24.5*  < > 23.9*     --   --    MCV 75*  --   --    MCH 22.5*  --   --    MCHC 30.2*  --   --    < > = values in this interval not displayed.  Lipid Panel:     Recent Labs  Lab 17  1454   CHOL 187   LDLCALC 117.0   HDL 56   TRIG 70     Coagulation:   Recent Labs  Lab 17  2236  17  0200   INR 1.1  < > 1.0   APTT 21.5  --   --    < > = values in this interval not displayed.  Platelet Aggregation Study: No results for input(s): PLTAGG, PLTAGINTERP, PLTAGREGLACO, ADPPLTAGGREG in the last 168 hours.  Hgb A1C:     Recent Labs  Lab 17  1454   HGBA1C 5.7     TSH:     Recent Labs  Lab 17  1454   TSH 1.409       Diagnostic Results:  I have personally reviewed:     MRI Brain. Date: 17  Large acute right MCA distribution infarct as  above.    Additional small right BRIDGET infarct.    Mild chronic microvascular ischemic changes.    CT Head. Date: 05/22/17  Limited exam given marked motion artifact.    Temporal evolution of large right MCA distribution infarction with less conspicuous focus of high attenuation within the right basal ganglia.  No new hemorrhage or new infarct.  No hydrocephalus.    CT Head. Date: 05/21/17  Interval development of small right basal ganglia hemorrhage versus contrast staining..    Subtle loss of gray-white differentiation and sulcal effacement in the posterior right frontal lobe compatible with evolution of acute right MCA territory infarct.      CTA Head from OSH. Date: 05/21/17  Occlusion of R supraclinoid carotid artery with nonvisualization of the M1 segment, possibly on the basis of intraluminal thrombus. R M2 segment is in part reconstituted via lenticulostriate collaterals. Dominant supply of the R BRIDGET territory via the a comm    Echo. Date: 05/22/17  -EF 73%  -no LAE  -concentric remodeling  -pulmonary HTN      KIMMY Son  Comprehensive Stroke Center  Department of Vascular Neurology   [facility]

## 2017-05-24 NOTE — PROGRESS NOTES
Notified [facility] of hemoglobin 7.1. Will draw 0000 H&H early to re-check H&H as verbally ordered by [facility]. Will report results and continue to monitor.

## 2017-05-24 NOTE — PROGRESS NOTES
Notified [facility] of hemoglobin of 7. No new orders at this time. Will recheck with morning labs at 3:00. Will continue to monitor.

## 2017-05-24 NOTE — ASSESSMENT & PLAN NOTE
Priya Knapp is a 82 y.o. female with R MCA treated with tPA and thrombectomy    Holding antiplatelets due to drop in h&h, transfused on 5/24/17  Atorvastatin 40   SBP <180 post tPA  PT/OT and speech to evaluate and treat  VTE ppx with SCDs, holding VTE ppx due to drop in h&h  Neuro checks q1h  Remains DNR/DNI  On mannitol

## 2017-05-24 NOTE — PLAN OF CARE
Problem: Patient Care Overview  Goal: Plan of Care Review  Outcome: Ongoing (interventions implemented as appropriate)  No acute events throughout the day, VS and assessment per flow sheet, patient progressing towards goal as tolerated. Plan of care reviewed with Priya Knapp and family, all concerns addressed. Will continue to monitor.

## 2017-05-24 NOTE — PLAN OF CARE
Problem: Patient Care Overview  Goal: Plan of Care Review  Outcome: Ongoing (interventions implemented as appropriate)  No acute events throughout the night, VS and assessment per flow sheet, patient progressing towards goal as tolerated. Plan of care reviewed with Priya Knapp and family, all concerns addressed. Will continue to monitor.

## 2017-05-24 NOTE — SUBJECTIVE & OBJECTIVE
Neurologic Chief Complaint: R MCA    Subjective:     Interval History: Patient is seen for follow-up neurological assessment and treatment recommendations: planning for ct abdomen today, considering transfusion if H&H drops further.   Patient needing ng placed under radiology as well today     HPI, Past Medical, Family, and Social History remains the same as documented in the initial encounter.     Review of Systems   Constitutional: Negative for fever.        Restless   HENT: Negative for drooling.    Neurological: Positive for facial asymmetry and weakness. Negative for speech difficulty.   Psychiatric/Behavioral: Negative for agitation and behavioral problems.     Scheduled Meds:   atorvastatin  40 mg Per NG tube Daily    lisinopril  20 mg Per NG tube Daily    pantoprazole  40 mg Intravenous BID    senna-docusate 8.6-50 mg  1 tablet Per NG tube BID    sodium chloride 0.9%  3 mL Intravenous Q8H     Continuous Infusions:   sodium chloride 0.9% 75 mL/hr (05/23/17 1901)     PRN Meds:acetaminophen, dextrose 50%, glucagon (human recombinant), hydrALAZINE, insulin aspart, magnesium sulfate IVPB, magnesium sulfate IVPB, midazolam (PF), ondansetron, potassium chloride, potassium chloride, potassium chloride, sodium phosphate IVPB, sodium phosphate IVPB, sodium phosphate IVPB    Objective:     Vital Signs (Most Recent):  Temp: 98.9 °F (37.2 °C) (05/23/17 1901)  Pulse: (!) 113 (05/23/17 1901)  Resp: 20 (05/23/17 1901)  BP: (!) 149/60 (05/23/17 1901)  SpO2: 100 % (05/23/17 1901)  BP Location: Right arm    Vital Signs Range (Last 24H):  Temp:  [98 °F (36.7 °C)-99 °F (37.2 °C)]   Pulse:  []   Resp:  [13-42]   BP: (124-151)/(58-80)   SpO2:  [96 %-100 %]   Arterial Line BP: ()/(40-63)   BP Location: Right arm    Physical Exam   Constitutional: She appears well-developed and well-nourished. No distress.   HENT:   Head: Normocephalic and atraumatic.   Eyes: Pupils are equal, round, and reactive to light.    Cardiovascular: Tachycardia present.    Pulmonary/Chest: Effort normal. No respiratory distress.   Neurological: She is alert.   Skin: Skin is warm and dry.   Psychiatric: She has a normal mood and affect. She is hyperactive (continuous movement on R side).   Vitals reviewed.      Neurological Exam:   LOC: follows requests and drowsy  Language: No aphasia  Speech: Dysarthria  Orientation: Person, Place, Time  Memory: Recent memory intact, Remote memory intact, Age correct, Month correct  Visual Fields (CN II): Hemianopsia  left  EOM (CN III, IV, VI): Gaze preference right  Facial Movement (CN VII): lower weakness left lower  Motor*: Arm Left:  Plegic (0/5), Leg Left:   Paretic:  1/5, Arm Right:   Normal (5/5), Leg Right:   Normal (5/5)  Tone: Arm-Left: flaccid; Leg-Left: normal; Arm-Right: normal; Leg-Right: normal  Neglect     NIH Stroke Scale:    Level of Consciousness: 1 - drowsy  LOC Questions: 0 - answers both correctly  LOC Commands: 0 - performs both correctly  Best Gaze: 2 - forced deviation  Visual: 2 - complete hemianopia  Facial Palsy: 1 - minor  Motor Left Arm: 4 - no movement  Motor Right Arm: 0 - no drift  Motor Left Le - no movement  Motor Right Le - no drift  Limb Ataxia: 0 - absent  Sensory: 0 - normal  Best Language: 0 - no aphasia  Dysarthria: 1 - mild to moderate dysarthria  Extinction and Inattention: 2 - complete neglect  NIH Stroke Scale Total: 17      Laboratory:  CMP:     Recent Labs  Lab 17  0820   CALCIUM 8.3*      K 3.9   CO2 17*   *   BUN 16   CREATININE 0.9     CBC:   Recent Labs  Lab 17  1847   WBC 7.50  --   --    RBC 3.29*  --   --    HGB 7.4*  < > 7.1*   HCT 24.5*  < > 23.9*     --   --    MCV 75*  --   --    MCH 22.5*  --   --    MCHC 30.2*  --   --    < > = values in this interval not displayed.  Lipid Panel:     Recent Labs  Lab 17  1454   CHOL 187   LDLCALC 117.0   HDL 56   TRIG 70     Coagulation:   Recent Labs  Lab  05/21/17  2236  05/23/17  0200   INR 1.1  < > 1.0   APTT 21.5  --   --    < > = values in this interval not displayed.  Platelet Aggregation Study: No results for input(s): PLTAGG, PLTAGINTERP, PLTAGREGLACO, ADPPLTAGGREG in the last 168 hours.  Hgb A1C:     Recent Labs  Lab 05/21/17  1454   HGBA1C 5.7     TSH:     Recent Labs  Lab 05/21/17  1454   TSH 1.409       Diagnostic Results:  I have personally reviewed:     MRI Brain. Date: 05/22/17  Large acute right MCA distribution infarct as above.    Additional small right BRIDGET infarct.    Mild chronic microvascular ischemic changes.    CT Head. Date: 05/22/17  Limited exam given marked motion artifact.    Temporal evolution of large right MCA distribution infarction with less conspicuous focus of high attenuation within the right basal ganglia.  No new hemorrhage or new infarct.  No hydrocephalus.    CT Head. Date: 05/21/17  Interval development of small right basal ganglia hemorrhage versus contrast staining..    Subtle loss of gray-white differentiation and sulcal effacement in the posterior right frontal lobe compatible with evolution of acute right MCA territory infarct.      CTA Head from OSH. Date: 05/21/17  Occlusion of R supraclinoid carotid artery with nonvisualization of the M1 segment, possibly on the basis of intraluminal thrombus. R M2 segment is in part reconstituted via lenticulostriate collaterals. Dominant supply of the R BRIDGET territory via the a comm    Echo. Date: 05/22/17  -EF 73%  -no LAE  -concentric remodeling  -pulmonary HTN

## 2017-05-25 PROBLEM — I63.511 ACUTE ISCHEMIC RIGHT MCA STROKE: Status: ACTIVE | Noted: 2017-05-25

## 2017-05-25 LAB
ABO + RH BLD: NORMAL
ALBUMIN SERPL BCP-MCNC: 2.8 G/DL
ALP SERPL-CCNC: 52 U/L
ALT SERPL W/O P-5'-P-CCNC: 39 U/L
ANION GAP SERPL CALC-SCNC: 10 MMOL/L
ANISOCYTOSIS BLD QL SMEAR: SLIGHT
AST SERPL-CCNC: 76 U/L
BASOPHILS # BLD AUTO: 0.02 K/UL
BASOPHILS NFR BLD: 0.3 %
BILIRUB SERPL-MCNC: 1 MG/DL
BLD GP AB SCN CELLS X3 SERPL QL: NORMAL
BUN SERPL-MCNC: 14 MG/DL
CALCIUM SERPL-MCNC: 8 MG/DL
CHLORIDE SERPL-SCNC: 114 MMOL/L
CO2 SERPL-SCNC: 18 MMOL/L
CREAT SERPL-MCNC: 0.8 MG/DL
DIFFERENTIAL METHOD: ABNORMAL
EOSINOPHIL # BLD AUTO: 0.1 K/UL
EOSINOPHIL NFR BLD: 1.3 %
ERYTHROCYTE [DISTWIDTH] IN BLOOD BY AUTOMATED COUNT: 21.9 %
EST. GFR  (AFRICAN AMERICAN): >60 ML/MIN/1.73 M^2
EST. GFR  (NON AFRICAN AMERICAN): >60 ML/MIN/1.73 M^2
GLUCOSE SERPL-MCNC: 97 MG/DL
HCT VFR BLD AUTO: 25.8 %
HCT VFR BLD AUTO: 25.8 %
HCT VFR BLD AUTO: 26.4 %
HCT VFR BLD AUTO: 27.2 %
HGB BLD-MCNC: 7.9 G/DL
HGB BLD-MCNC: 7.9 G/DL
HGB BLD-MCNC: 8.1 G/DL
HGB BLD-MCNC: 8.2 G/DL
HYPOCHROMIA BLD QL SMEAR: ABNORMAL
INR PPP: 1
LYMPHOCYTES # BLD AUTO: 0.6 K/UL
LYMPHOCYTES NFR BLD: 9.2 %
MAGNESIUM SERPL-MCNC: 1.9 MG/DL
MCH RBC QN AUTO: 23.7 PG
MCHC RBC AUTO-ENTMCNC: 30.6 %
MCV RBC AUTO: 78 FL
MONOCYTES # BLD AUTO: 0.6 K/UL
MONOCYTES NFR BLD: 9.4 %
NEUTROPHILS # BLD AUTO: 4.8 K/UL
NEUTROPHILS NFR BLD: 79.8 %
OVALOCYTES BLD QL SMEAR: ABNORMAL
PHOSPHATE SERPL-MCNC: 3.7 MG/DL
PHOSPHATE SERPL-MCNC: 3.8 MG/DL
PLATELET # BLD AUTO: 155 K/UL
PLATELET BLD QL SMEAR: ABNORMAL
PMV BLD AUTO: 10.1 FL
POCT GLUCOSE: 103 MG/DL (ref 70–110)
POCT GLUCOSE: 125 MG/DL (ref 70–110)
POCT GLUCOSE: 84 MG/DL (ref 70–110)
POCT GLUCOSE: 98 MG/DL (ref 70–110)
POIKILOCYTOSIS BLD QL SMEAR: SLIGHT
POLYCHROMASIA BLD QL SMEAR: ABNORMAL
POTASSIUM SERPL-SCNC: 3.5 MMOL/L
POTASSIUM SERPL-SCNC: 3.6 MMOL/L
PROT SERPL-MCNC: 5.3 G/DL
PROTHROMBIN TIME: 10.9 SEC
RBC # BLD AUTO: 3.33 M/UL
SODIUM SERPL-SCNC: 142 MMOL/L
TROPONIN I SERPL DL<=0.01 NG/ML-MCNC: 0.76 NG/ML
WBC # BLD AUTO: 6.06 K/UL

## 2017-05-25 PROCEDURE — 99233 SBSQ HOSP IP/OBS HIGH 50: CPT | Mod: ,,, | Performed by: PSYCHIATRY & NEUROLOGY

## 2017-05-25 PROCEDURE — 85014 HEMATOCRIT: CPT

## 2017-05-25 PROCEDURE — 63600175 PHARM REV CODE 636 W HCPCS: Performed by: PHYSICIAN ASSISTANT

## 2017-05-25 PROCEDURE — 99291 CRITICAL CARE FIRST HOUR: CPT | Mod: ,,, | Performed by: PSYCHIATRY & NEUROLOGY

## 2017-05-25 PROCEDURE — C9113 INJ PANTOPRAZOLE SODIUM, VIA: HCPCS | Performed by: PHYSICIAN ASSISTANT

## 2017-05-25 PROCEDURE — 63600175 PHARM REV CODE 636 W HCPCS: Performed by: PSYCHIATRY & NEUROLOGY

## 2017-05-25 PROCEDURE — 85018 HEMOGLOBIN: CPT | Mod: 91

## 2017-05-25 PROCEDURE — 85610 PROTHROMBIN TIME: CPT

## 2017-05-25 PROCEDURE — 85025 COMPLETE CBC W/AUTO DIFF WBC: CPT

## 2017-05-25 PROCEDURE — 25000003 PHARM REV CODE 250: Performed by: PHYSICIAN ASSISTANT

## 2017-05-25 PROCEDURE — 20000000 HC ICU ROOM

## 2017-05-25 PROCEDURE — 84100 ASSAY OF PHOSPHORUS: CPT

## 2017-05-25 PROCEDURE — 83735 ASSAY OF MAGNESIUM: CPT

## 2017-05-25 PROCEDURE — 80053 COMPREHEN METABOLIC PANEL: CPT

## 2017-05-25 PROCEDURE — 27000221 HC OXYGEN, UP TO 24 HOURS

## 2017-05-25 PROCEDURE — 85018 HEMOGLOBIN: CPT

## 2017-05-25 PROCEDURE — 85014 HEMATOCRIT: CPT | Mod: 91

## 2017-05-25 PROCEDURE — 84132 ASSAY OF SERUM POTASSIUM: CPT

## 2017-05-25 PROCEDURE — 25000003 PHARM REV CODE 250: Performed by: STUDENT IN AN ORGANIZED HEALTH CARE EDUCATION/TRAINING PROGRAM

## 2017-05-25 PROCEDURE — 63600175 PHARM REV CODE 636 W HCPCS: Performed by: STUDENT IN AN ORGANIZED HEALTH CARE EDUCATION/TRAINING PROGRAM

## 2017-05-25 PROCEDURE — 84484 ASSAY OF TROPONIN QUANT: CPT

## 2017-05-25 PROCEDURE — 84100 ASSAY OF PHOSPHORUS: CPT | Mod: 91

## 2017-05-25 RX ORDER — HEPARIN SODIUM 5000 [USP'U]/ML
5000 INJECTION, SOLUTION INTRAVENOUS; SUBCUTANEOUS EVERY 8 HOURS
Status: DISCONTINUED | OUTPATIENT
Start: 2017-05-25 | End: 2017-05-25

## 2017-05-25 RX ORDER — MORPHINE SULFATE 2 MG/ML
2 INJECTION, SOLUTION INTRAMUSCULAR; INTRAVENOUS
Status: DISCONTINUED | OUTPATIENT
Start: 2017-05-25 | End: 2017-05-26 | Stop reason: HOSPADM

## 2017-05-25 RX ORDER — ACETAMINOPHEN 325 MG/1
650 TABLET ORAL EVERY 6 HOURS
Status: DISCONTINUED | OUTPATIENT
Start: 2017-05-25 | End: 2017-05-25

## 2017-05-25 RX ORDER — LORAZEPAM 2 MG/ML
1 INJECTION INTRAMUSCULAR
Status: DISCONTINUED | OUTPATIENT
Start: 2017-05-25 | End: 2017-05-26 | Stop reason: HOSPADM

## 2017-05-25 RX ORDER — HALOPERIDOL 5 MG/ML
1 INJECTION INTRAMUSCULAR EVERY 4 HOURS PRN
Status: DISCONTINUED | OUTPATIENT
Start: 2017-05-25 | End: 2017-05-26 | Stop reason: HOSPADM

## 2017-05-25 RX ADMIN — FENTANYL CITRATE 12.5 MCG: 50 INJECTION INTRAMUSCULAR; INTRAVENOUS at 01:05

## 2017-05-25 RX ADMIN — HEPARIN SODIUM 5000 UNITS: 5000 INJECTION, SOLUTION INTRAVENOUS; SUBCUTANEOUS at 01:05

## 2017-05-25 RX ADMIN — SODIUM CHLORIDE, PRESERVATIVE FREE 3 ML: 5 INJECTION INTRAVENOUS at 05:05

## 2017-05-25 RX ADMIN — MORPHINE SULFATE 2 MG: 2 INJECTION, SOLUTION INTRAMUSCULAR; INTRAVENOUS at 10:05

## 2017-05-25 RX ADMIN — FENTANYL CITRATE 12.5 MCG: 50 INJECTION INTRAMUSCULAR; INTRAVENOUS at 08:05

## 2017-05-25 RX ADMIN — HYDRALAZINE HYDROCHLORIDE 10 MG: 20 INJECTION INTRAMUSCULAR; INTRAVENOUS at 04:05

## 2017-05-25 RX ADMIN — MANNITOL 25 G: 12.5 INJECTION, SOLUTION INTRAVENOUS at 12:05

## 2017-05-25 RX ADMIN — PANTOPRAZOLE SODIUM 40 MG: 40 INJECTION, POWDER, FOR SOLUTION INTRAVENOUS at 08:05

## 2017-05-25 RX ADMIN — FENTANYL CITRATE 12.5 MCG: 50 INJECTION INTRAMUSCULAR; INTRAVENOUS at 05:05

## 2017-05-25 RX ADMIN — FENTANYL CITRATE 12.5 MCG: 50 INJECTION INTRAMUSCULAR; INTRAVENOUS at 09:05

## 2017-05-25 RX ADMIN — SODIUM CHLORIDE: 234 INJECTION INTRAMUSCULAR; INTRAVENOUS; SUBCUTANEOUS at 01:05

## 2017-05-25 RX ADMIN — SODIUM PHOSPHATE, MONOBASIC, MONOHYDRATE AND SODIUM PHOSPHATE, DIBASIC, ANHYDROUS 15 MMOL: 276; 142 INJECTION, SOLUTION INTRAVENOUS at 02:05

## 2017-05-25 RX ADMIN — POTASSIUM CHLORIDE 10 MEQ: 10 INJECTION, SOLUTION INTRAVENOUS at 04:05

## 2017-05-25 RX ADMIN — MORPHINE SULFATE 2 MG: 2 INJECTION, SOLUTION INTRAMUSCULAR; INTRAVENOUS at 03:05

## 2017-05-25 NOTE — PROGRESS NOTES
[facility]  Vascular Neurology  Comprehensive Stroke Center  Progress Note      Assessment/Plan:     Priya Knapp is a 82 y.o. Female with R MCA syndrome treated with tPA and going for thrombectomy. Patient had CTA at OSH. In order to limit the use of contrast in this patient, it was decided to take patient for CT head. There was evidence of some early ischemic change, but decision was made to bring patient to IR. In IR, patient had tortuosity in R ICA with R carotid terminus occlusion and TICI 1 flow.    05/22/17 MRI with large R MCA infarct. Patient restless with continuous movement of right side. Currently on cardene  5/23/17 - following H&H, following commands   5/24/17 - patient with lethargy and concern for increased swelling, patients family updated - still ok with DNR/DNI - getting mannitol   5/25/17 - patient still lethargic, received dose of mannitol overnight, CT 5/24 with slight increased mass effect    * Embolic stroke involving right middle cerebral artery    Priya Knapp is a 82 y.o. female with R MCA treated with tPA and thrombectomy    Holding antiplatelets due to drop in h&h, transfused on 5/24/17  Atorvastatin 40   SBP <180 post tPA  PT/OT and speech to evaluate and treat  VTE ppx with SCDs, holding VTE ppx due to drop in h&h  Neuro checks q1h  Remains DNR/DNI  On mannitol                                       Cytotoxic cerebral edema    2/2 infarct  Evident on imaging        Dysphagia    NG replaced         Elevated troponin    Per [facility]         Left homonymous hemianopsia    2/2 stroke        Dysarthria    2/2 stroke  Aggressive speech therapy  Ng replaced 5/23/17        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     Review of Systems   Constitutional: Positive for fatigue. Negative for fever.   HENT: Negative for drooling.    Respiratory: Negative for cough.    Gastrointestinal: Negative for vomiting.   Neurological: Positive for facial asymmetry and weakness. Negative for speech difficulty.   Psychiatric/Behavioral: Negative for agitation and behavioral problems.     Scheduled Meds:   acetaminophen  650 mg Per NG tube Q6H    atorvastatin  40 mg Per NG tube Daily    heparin (porcine)  5,000 Units Subcutaneous Q8H    lisinopril  20 mg Per NG tube Daily    pantoprazole  40 mg Intravenous BID    senna-docusate 8.6-50 mg  1 tablet Per NG tube BID    sodium chloride 0.9%  3 mL Intravenous Q8H     Continuous Infusions:   sodium chloride 0.9% 75 mL/hr at 05/25/17 0600    Sodium Chloride 2%       PRN Meds:sodium chloride, dextrose 50%, fentaNYL, glucagon (human recombinant), hydrALAZINE, insulin aspart, magnesium sulfate IVPB, magnesium sulfate IVPB, midazolam (PF), ondansetron, potassium chloride, potassium chloride, potassium chloride, sodium phosphate IVPB, sodium phosphate IVPB, sodium phosphate IVPB    Objective:     Vital Signs (Most Recent):  Temp: 100.2 °F (37.9 °C) (05/25/17 0705)  Pulse: 109 (05/25/17 0900)  Resp: (!) 24 (05/25/17 0900)  BP: (!) 169/74 (05/25/17 0900)  SpO2: 100 % (05/25/17 0900)  BP Location: Right arm    Vital Signs Range (Last 24H):  Temp:  [98.9 °F (37.2 °C)-100.2 °F (37.9 °C)]   Pulse:  []   Resp:  [12-35]   BP: (131-169)/()   SpO2:  [99 %-100 %]   Arterial Line BP: ()/()   BP Location: Right arm    Physical Exam   Constitutional: She appears well-developed and well-nourished. No distress.   Fatigue    HENT:   Head: Normocephalic and atraumatic.   Eyes: Pupils are equal, round, and reactive to light.   Cardiovascular: Tachycardia present.     Pulmonary/Chest: Effort normal. No respiratory distress.   Skin: Skin is warm and dry.   Psychiatric: She has a normal mood and affect.   Vitals reviewed.      Neurological Exam:   LOC: follows requests and drowsy  Speech: Dysarthria  Orientation: Person, Place, Time  Visual Fields (CN II): Hemianopsia  left  EOM (CN III, IV, VI): Gaze preference right  Facial Movement (CN VII): lower weakness left lower  Motor*: Arm Left:  Plegic (0/5), Leg Left:   Paretic:  1/5, Arm Right:   Normal (5/5), Leg Right:   Normal (5/5)  Tone: Arm-Left: flaccid; Leg-Left: normal; Arm-Right: normal; Leg-Right: normal  Neglect     NIH Stroke Scale:    Level of Consciousness: 1 - drowsy  LOC Questions: 0 - answers both correctly  LOC Commands: 0 - performs both correctly  Best Gaze: 2 - forced deviation  Visual: 2 - complete hemianopia  Facial Palsy: 1 - minor  Motor Left Arm: 4 - no movement  Motor Right Arm: 0 - no drift  Motor Left Leg: 3 - no effort against gravity  Motor Right Le - no drift  Limb Ataxia: 0 - absent  Sensory: 0 - normal  Best Language: 0 - no aphasia  Dysarthria: 1 - mild to moderate dysarthria  Extinction and Inattention: 2 - complete neglect  NIH Stroke Scale Total: 16      Laboratory:  CMP:     Recent Labs  Lab 17  0300 17  0829   CALCIUM 8.0*  --    ALBUMIN 2.8*  --    PROT 5.3*  --      --    K 3.6 3.5   CO2 18*  --    *  --    BUN 14  --    CREATININE 0.8  --    ALKPHOS 52*  --    ALT 39  --    AST 76*  --    BILITOT 1.0  --      CBC:     Recent Labs  Lab 17  0300   WBC 6.06   RBC 3.33*   HGB 7.9*  7.9*   HCT 25.8*  25.8*      MCV 78*   MCH 23.7*   MCHC 30.6*     Lipid Panel:     Recent Labs  Lab 17  1454   CHOL 187   LDLCALC 117.0   HDL 56   TRIG 70     Coagulation:   Recent Labs  Lab 17  2236  17  0300   INR 1.1  < > 1.0   APTT 21.5  --   --    < > = values in this interval not displayed.  Platelet Aggregation Study: No results for input(s): PLTAGG,  PLTAGINTERP, PLTAGREGLACO, ADPPLTAGGREG in the last 168 hours.  Hgb A1C:     Recent Labs  Lab 05/21/17  1454   HGBA1C 5.7     TSH:     Recent Labs  Lab 05/21/17  1454   TSH 1.409       Diagnostic Results:  I have personally reviewed:     MRI Brain. Date: 05/22/17  Large acute right MCA distribution infarct as above.    Additional small right BRIDGET infarct.    Mild chronic microvascular ischemic changes.    CT Head. Date: 05/22/17  Limited exam given marked motion artifact.    Temporal evolution of large right MCA distribution infarction with less conspicuous focus of high attenuation within the right basal ganglia.  No new hemorrhage or new infarct.  No hydrocephalus.    CT Head. Date: 05/21/17  Interval development of small right basal ganglia hemorrhage versus contrast staining..    Subtle loss of gray-white differentiation and sulcal effacement in the posterior right frontal lobe compatible with evolution of acute right MCA territory infarct.      CTA Head from OSH. Date: 05/21/17  Occlusion of R supraclinoid carotid artery with nonvisualization of the M1 segment, possibly on the basis of intraluminal thrombus. R M2 segment is in part reconstituted via lenticulostriate collaterals. Dominant supply of the R BRIDGET territory via the a comm    Echo. Date: 05/22/17  -EF 73%  -no LAE  -concentric remodeling  -pulmonary HTN    CT head 5/24/17   Evolving large right MCA distribution infarct with slight increase in mass effect as above. No new hemorrhagic conversion, infarct extension, or new territorial infarct.          Maggie Cruz PA-C  Comprehensive Stroke Center  Department of Vascular Neurology   [facility]

## 2017-05-25 NOTE — PROGRESS NOTES
[facility]  Neurocritical Care  Progress Note    Admit Date: 5/21/2017  Service Date: 05/25/2017  Length of Stay: 4    Subjective:     Chief Complaint: Embolic stroke involving right middle cerebral artery    History of Present Illness: Mrs. Knapp is an 82 year old woman with a history of PUD, hiatal hernia and polymyalgia rheumatica who was transferred from [facility] for management of stroke. She was admitted there for a GI bleed, however a scope showed no evidence of active bleeding. Around 8:20am on 5/21 she developed sudden onset of left sided weakness and right gaze preference. CTA at the outside facility showed a possible R ICA occlusion. She received tPA at 10:05am and was transferred here for possible thrombectomy.    Symptoms have not improved significantly since onset. CT head on arrival showed a small area of hypoattenuation. The decision was made to take her to IR for thrombectomy.     Hospital Course: 5/22 CT head with large infarct.   5/23 H/H stable. CT abdomen ordered to evaluate for other source of bleed  5/24 Unable to place NG tube with fluoro.   5/25 Plan to withdraw care and transition to hospice.     Interval History:  No events overnight. She remains restless, but oriented. After discussions with family today, the decision was made to transition to comfort care with hospice. PRN pain medicine for now.    Review of Systems   Respiratory: Negative for shortness of breath.    Cardiovascular: Negative for chest pain.   Neurological: Positive for facial asymmetry and weakness.     Objective:     Vitals:  Temp: 100.2 °F (37.9 °C) (05/25/17 0705)  Pulse: 109 (05/25/17 0900)  Resp: (!) 24 (05/25/17 0900)  BP: (!) 169/74 (05/25/17 0900)  SpO2: 100 % (05/25/17 0900)    Temp:  [99.6 °F (37.6 °C)-100.2 °F (37.9 °C)] 100.2 °F (37.9 °C)  Pulse:  [] 109  Resp:  [12-35] 24  SpO2:  [99 %-100 %] 100 %  BP: (131-169)/() 169/74  Arterial Line BP: (131-171)/()  132/69        05/24 0701 - 05/25 0700  In: 1722.5 [I.V.:1622.5]  Out: 1545 [Urine:1545]    Physical Exam    GA: Alert, restless. No acute distress  HEENT: No scleral icterus or JVD.   Pulmonary: Clear to auscultation A/L. No wheezing, crackles, or rhonchi.  Cardiac: RRR S1 & S2 w systolic murmur.   Abdominal: Bowel sounds present x 4. No appreciable hepatosplenomegaly.  Skin: No jaundice, rashes, or visible lesions.     Neuro:  --sedation: none  --GCS: E4V5M6  --Mental Status: Drowsy, awake and oriented. Follows commands. Severe dysarthria  --CN II-XII: left lower facial weakness. Right gaze preference, unable to look fully left though can cross midline. Left hemianopsia  --Pupils 3mm, PERRL.   --brainstem: deferred  --Motor: LUE with limited movement. Moves bilateral lower extremities (R>L) and RUE spontaneously.   --sensory: left yuan-hypoesthesia  --Gait: deferred        [place] Coma Scale    Medications:  Continuous   Scheduled   PRN    haloperidol lactate 1 mg Q4H PRN   lorazepam 1 mg Q1H PRN   morphine 2 mg Q15 Min PRN   ondansetron 4 mg Q12H PRN     Today I personally reviewed pertinent medications, imaging, lab results,       Assessment/Plan:     Neuro   * Embolic stroke involving right middle cerebral artery    Patient was at an outside facility when she developed sudden onset of left weakness and right gaze preference concerning for an ischemic stroke. She received tPA (5/21) and was transferred here for intervention. She was taken to IR for thrombectomy of right ICA/M1 occlusion, but was unsuccessful with TICI 1. Neuroimaging shows a large territory right MCA stroke, with some mideline shift. Family relayed the patient's wishes of DNR/DNI, and are aware that the patient's condition could get worse. Etiology unclear at this time - echo unremarkable with EF 74%.    After discussion with family, the decision was made to transition to comfort care with hospice. SW/CM were notified, and Palliative care is  consulted for help with end of life discussions.     -- PRNN lorazepam, haloperidol, morphine              Prophylaxis:  Venous Thromboembolism: none  Stress Ulcer: None  Ventilator Pneumonia: not applicable     Activity Orders          None        DNR    Regina Howard MD  Neurocritical Care  [facility]

## 2017-05-25 NOTE — PROGRESS NOTES
ICU Attending Note  Neurocritical Care    E2V3M6    -start aspirin 81 mg  -atorvastatin  -likely repeat CT head tomorrow  --160  -lisinopril  -stop NS, start 2% 75 mL/h, q8h Na, Na goal 145-155  -acetaminophen 650 mg q6h  -advance NGT 10 cm then reshoot CXR  -heparin prophylaxis  -pantoprazole q12h  -activity as tolerated  -DNR/DNI    Goal: Continue management of LHI.    ADDENDUM  I spoke with her sons, particularly her POA Josh. The patient is unable to participate due to stroke. This meeting included discussion of the diagnosis, prognosis, and goals of care, was absolutely necessary for treatment decisions, and bore directly on the management of the patient.    Ms Knapp suffered a large R MCA ischemic stroke despite IV tPA and IR thrombectomy. She has L hemiplegia, L neglect, L hemianopia, and impaired arousal. Her prognosis for an independent recovery is grim and would occur only after months of rehabilitation with a PEG tube +/- tracheostomy. I discussed paths of care:    1. Aggressive medical care including surgery, trach, PEG, with hopes for recovery after months.  2. Above + DNR/DNI.  3. Comfort measures only.    Her sons state her condition is contrary to her known wishes. They request comfort measures only.    I addressed dying at length, particularly the time until cardiovascular death, comfort measures, and palliative care. I addressed measures to prevent pain, air hunger, and excess respiratory secretions. I addressed concerns regarding fluids and nutrition.     -stop IVF  -stop all medications  -start morphine drip, morphine prn, lorazepam/haloperidol prn  -plan for hospice

## 2017-05-25 NOTE — SUBJECTIVE & OBJECTIVE
Neurologic Chief Complaint: R MCA    Subjective:     Interval History: Patient is seen for follow-up neurological assessment and treatment recommendations: continued lethargy, given one dose of mannitol overnight    HPI, Past Medical, Family, and Social History remains the same as documented in the initial encounter.     Review of Systems   Constitutional: Positive for fatigue. Negative for fever.   HENT: Negative for drooling.    Respiratory: Negative for cough.    Gastrointestinal: Negative for vomiting.   Neurological: Positive for facial asymmetry and weakness. Negative for speech difficulty.   Psychiatric/Behavioral: Negative for agitation and behavioral problems.     Scheduled Meds:   acetaminophen  650 mg Per NG tube Q6H    atorvastatin  40 mg Per NG tube Daily    heparin (porcine)  5,000 Units Subcutaneous Q8H    lisinopril  20 mg Per NG tube Daily    pantoprazole  40 mg Intravenous BID    senna-docusate 8.6-50 mg  1 tablet Per NG tube BID    sodium chloride 0.9%  3 mL Intravenous Q8H     Continuous Infusions:   sodium chloride 0.9% 75 mL/hr at 05/25/17 0600    Sodium Chloride 2%       PRN Meds:sodium chloride, dextrose 50%, fentaNYL, glucagon (human recombinant), hydrALAZINE, insulin aspart, magnesium sulfate IVPB, magnesium sulfate IVPB, midazolam (PF), ondansetron, potassium chloride, potassium chloride, potassium chloride, sodium phosphate IVPB, sodium phosphate IVPB, sodium phosphate IVPB    Objective:     Vital Signs (Most Recent):  Temp: 100.2 °F (37.9 °C) (05/25/17 0705)  Pulse: 109 (05/25/17 0900)  Resp: (!) 24 (05/25/17 0900)  BP: (!) 169/74 (05/25/17 0900)  SpO2: 100 % (05/25/17 0900)  BP Location: Right arm    Vital Signs Range (Last 24H):  Temp:  [98.9 °F (37.2 °C)-100.2 °F (37.9 °C)]   Pulse:  []   Resp:  [12-35]   BP: (131-169)/()   SpO2:  [99 %-100 %]   Arterial Line BP: ()/()   BP Location: Right arm    Physical Exam   Constitutional: She appears  well-developed and well-nourished. No distress.   Fatigue    HENT:   Head: Normocephalic and atraumatic.   Eyes: Pupils are equal, round, and reactive to light.   Cardiovascular: Tachycardia present.    Pulmonary/Chest: Effort normal. No respiratory distress.   Skin: Skin is warm and dry.   Psychiatric: She has a normal mood and affect.   Vitals reviewed.      Neurological Exam:   LOC: follows requests and drowsy  Speech: Dysarthria  Orientation: Person, Place, Time  Visual Fields (CN II): Hemianopsia  left  EOM (CN III, IV, VI): Gaze preference right  Facial Movement (CN VII): lower weakness left lower  Motor*: Arm Left:  Plegic (0/5), Leg Left:   Paretic:  1/5, Arm Right:   Normal (5/5), Leg Right:   Normal (5/5)  Tone: Arm-Left: flaccid; Leg-Left: normal; Arm-Right: normal; Leg-Right: normal  Neglect     NIH Stroke Scale:    Level of Consciousness: 1 - drowsy  LOC Questions: 0 - answers both correctly  LOC Commands: 0 - performs both correctly  Best Gaze: 2 - forced deviation  Visual: 2 - complete hemianopia  Facial Palsy: 1 - minor  Motor Left Arm: 4 - no movement  Motor Right Arm: 0 - no drift  Motor Left Leg: 3 - no effort against gravity  Motor Right Le - no drift  Limb Ataxia: 0 - absent  Sensory: 0 - normal  Best Language: 0 - no aphasia  Dysarthria: 1 - mild to moderate dysarthria  Extinction and Inattention: 2 - complete neglect  NIH Stroke Scale Total: 16      Laboratory:  CMP:     Recent Labs  Lab 17  0300 17  0829   CALCIUM 8.0*  --    ALBUMIN 2.8*  --    PROT 5.3*  --      --    K 3.6 3.5   CO2 18*  --    *  --    BUN 14  --    CREATININE 0.8  --    ALKPHOS 52*  --    ALT 39  --    AST 76*  --    BILITOT 1.0  --      CBC:     Recent Labs  Lab 17  0300   WBC 6.06   RBC 3.33*   HGB 7.9*  7.9*   HCT 25.8*  25.8*      MCV 78*   MCH 23.7*   MCHC 30.6*     Lipid Panel:     Recent Labs  Lab 17  1454   CHOL 187   LDLCALC 117.0   HDL 56   TRIG 70      Coagulation:   Recent Labs  Lab 05/21/17  2236  05/25/17  0300   INR 1.1  < > 1.0   APTT 21.5  --   --    < > = values in this interval not displayed.  Platelet Aggregation Study: No results for input(s): PLTAGG, PLTAGINTERP, PLTAGREGLACO, ADPPLTAGGREG in the last 168 hours.  Hgb A1C:     Recent Labs  Lab 05/21/17  1454   HGBA1C 5.7     TSH:     Recent Labs  Lab 05/21/17  1454   TSH 1.409       Diagnostic Results:  I have personally reviewed:     MRI Brain. Date: 05/22/17  Large acute right MCA distribution infarct as above.    Additional small right BRIDGET infarct.    Mild chronic microvascular ischemic changes.    CT Head. Date: 05/22/17  Limited exam given marked motion artifact.    Temporal evolution of large right MCA distribution infarction with less conspicuous focus of high attenuation within the right basal ganglia.  No new hemorrhage or new infarct.  No hydrocephalus.    CT Head. Date: 05/21/17  Interval development of small right basal ganglia hemorrhage versus contrast staining..    Subtle loss of gray-white differentiation and sulcal effacement in the posterior right frontal lobe compatible with evolution of acute right MCA territory infarct.      CTA Head from OSH. Date: 05/21/17  Occlusion of R supraclinoid carotid artery with nonvisualization of the M1 segment, possibly on the basis of intraluminal thrombus. R M2 segment is in part reconstituted via lenticulostriate collaterals. Dominant supply of the R BRIDGET territory via the a comm    Echo. Date: 05/22/17  -EF 73%  -no LAE  -concentric remodeling  -pulmonary HTN    CT head 5/24/17   Evolving large right MCA distribution infarct with slight increase in mass effect as above. No new hemorrhagic conversion, infarct extension, or new territorial infarct.

## 2017-05-25 NOTE — PLAN OF CARE
05/25/17 0754   Discharge Reassessment   Assessment Type Discharge Planning Reassessment   Can the patient answer the patient profile reliably? No, cognitively impaired   How does the patient rate their overall health at the present time? Poor   Describe the patient's ability to walk at the present time. Does not walk or unable to take any steps at all   How often would a person be available to care for the patient? Whenever needed   Number of comorbid conditions (as recorded on the chart) One   During the past month, has the patient often been bothered by feeling down, depressed or hopeless? (aye)   During the past month, has the patient often been bothered by little interest or pleasure in doing things? (aye)   Discharge Plan A Other  (TBD)   Change in patient condition or support system No   Patient choice form signed by patient/caregiver N/A   Involved the patient/caregiver in establishing a new discharge plan: Yes       Discharge plan to be determine once patient clinically stable.    Laura Parada CM  [id number]

## 2017-05-25 NOTE — PHYSICIAN QUERY
PT Name: Priya Knapp  MR #: [id number]     Physician Query Form - Documentation Clarification      CDS/: COLETTE Camargo RN                Contact information: [phone]    This form is a permanent document in the medical record.     Query Date: May 25, 2017    By submitting this query, we are merely seeking further clarification of documentation. Please utilize your independent clinical judgment when addressing the question(s) below.    The Medical record reflects the following:    Supporting Clinical Findings Location in Medical Record   Slight worsening in level of alertness. Stat CT head.     Evolving large area of recent infarction within the right MCA distribution (also likely distal BRIDGET). Infarct territory appears stable without significant infarct extension. Slight increase in overall mass effect with approximately 0.4 cm of leftward   midline shift on today's exam.        Neuro CC PN 5/24      CT Head 5/24     sodium chloride (23.4%) 4 mEq/mL 172 mEq in sterile water 500 mL (sodium chloride 2%) infusion     q 1 hour neuro checks        MAR 5/25        Neuro surgery PN 5/25                                                                            Doctor, Please specify diagnosis or diagnoses associated with above clinical findings.    Provider Use Only    [  x  ]  Cerebral Compression     [    ]  Other                                                                                                                           [  ] Clinically undetermined

## 2017-05-25 NOTE — PT/OT/SLP PROGRESS
Speech Language Pathology      Priya Knapp  MRN: [id number]    Patient not seen today secondary to pt's family asking therapist to allow pt to sleep. Family reported pt has been very restless and finally went to sleep. Son reported decreased talking since Tuesday.   Will follow-up Friday.    CECY Perez, CCC-SLP  5/25/2017

## 2017-05-25 NOTE — SUBJECTIVE & OBJECTIVE
Interval History:  No events overnight. She remains restless, but oriented. After discussions with family today, the decision was made to transition to comfort care with hospice. PRN pain medicine for now.    Review of Systems   Respiratory: Negative for shortness of breath.    Cardiovascular: Negative for chest pain.   Neurological: Positive for facial asymmetry and weakness.     Objective:     Vitals:  Temp: 100.2 °F (37.9 °C) (05/25/17 0705)  Pulse: 109 (05/25/17 0900)  Resp: (!) 24 (05/25/17 0900)  BP: (!) 169/74 (05/25/17 0900)  SpO2: 100 % (05/25/17 0900)    Temp:  [99.6 °F (37.6 °C)-100.2 °F (37.9 °C)] 100.2 °F (37.9 °C)  Pulse:  [] 109  Resp:  [12-35] 24  SpO2:  [99 %-100 %] 100 %  BP: (131-169)/() 169/74  Arterial Line BP: (131-171)/() 132/69        05/24 0701 - 05/25 0700  In: 1722.5 [I.V.:1622.5]  Out: 1545 [Urine:1545]    Physical Exam    GA: Alert, restless. No acute distress  HEENT: No scleral icterus or JVD.   Pulmonary: Clear to auscultation A/L. No wheezing, crackles, or rhonchi.  Cardiac: RRR S1 & S2 w systolic murmur.   Abdominal: Bowel sounds present x 4. No appreciable hepatosplenomegaly.  Skin: No jaundice, rashes, or visible lesions.     Neuro:  --sedation: none  --GCS: E4V5M6  --Mental Status: Drowsy, awake and oriented. Follows commands. Severe dysarthria  --CN II-XII: left lower facial weakness. Right gaze preference, unable to look fully left though can cross midline. Left hemianopsia  --Pupils 3mm, PERRL.   --brainstem: deferred  --Motor: LUE with limited movement. Moves bilateral lower extremities (R>L) and RUE spontaneously.   --sensory: left yuan-hypoesthesia  --Gait: deferred        [place] Coma Scale    Medications:  Continuous   Scheduled   PRN    haloperidol lactate 1 mg Q4H PRN   lorazepam 1 mg Q1H PRN   morphine 2 mg Q15 Min PRN   ondansetron 4 mg Q12H PRN     Today I personally reviewed pertinent medications, imaging, lab results,

## 2017-05-25 NOTE — PROGRESS NOTES
Progress Note  Neurosurgery    Admit Date: 5/21/2017  Post-operative Day:    Hospital Day: 4    SUBJECTIVE:     Priya Knapp is a 82 y.o. female Right MCA stoke, s/p iv tPA and interventional thrombectomy.   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  Follow-up For:  * No surgery found *    No AE. AFVSS    Scheduled Meds:   atorvastatin  40 mg Per NG tube Daily    lisinopril  20 mg Per NG tube Daily    mannitol 25%  25 g Intravenous Q6H    pantoprazole  40 mg Intravenous BID    senna-docusate 8.6-50 mg  1 tablet Per NG tube BID    sodium chloride 0.9%  3 mL Intravenous Q8H     Continuous Infusions:   sodium chloride 0.9% 75 mL/hr at 05/24/17 1622     PRN Meds:sodium chloride, acetaminophen, dextrose 50%, fentaNYL, glucagon (human recombinant), hydrALAZINE, insulin aspart, magnesium sulfate IVPB, magnesium sulfate IVPB, midazolam (PF), ondansetron, potassium chloride, potassium chloride, potassium chloride, sodium phosphate IVPB, sodium phosphate IVPB, sodium phosphate IVPB    Review of patient's allergies indicates:   Allergen Reactions    Codeine     Darvon [propoxyphene]     Macrodantin [nitrofurantoin macrocrystal]     Penicillins     Pneumococcal 23-erika ps vaccine     Talwin [pentazocine lactate]        OBJECTIVE:     Vital Signs (Most Recent)  Temp: 98.9 °F (37.2 °C) (05/24/17 1500)  Pulse: 103 (05/24/17 1800)  Resp: (!) 35 (05/24/17 1800)  BP: (!) 156/78 (05/24/17 1800)  SpO2: 100 % (05/24/17 1800)    Vital Signs Range (Last 24H):  Temp:  [98.9 °F (37.2 °C)-99.9 °F (37.7 °C)]   Pulse:  []   Resp:  [13-52]   BP: ()/(59-84)   SpO2:  [100 %]   Arterial Line BP: ()/()     I & O (Last 24H):    Intake/Output Summary (Last 24 hours) at 05/24/17 1927  Last data filed at 05/24/17 1622   Gross per 24 hour   Intake          1498.75 ml   Output             1460 ml   Net            38.75 ml     Physical Exam:  PERRL  Speech dysarthric.   Opens eyes to stim,  drowsy but arousable. Follows commands thumbs up R.   E3V5M6.   Left hemiparesis  ASA: 3  Mallampati: 2    Lines/Drains:       Peripheral IV - Single Lumen 05/21/17 1452 Left Forearm (Active)   Site Assessment Clean;Dry;Intact;No redness;No swelling 5/22/2017  3:03 AM   Line Status Flushed;Infusing 5/22/2017  3:03 AM   Dressing Status Clean;Dry;Intact 5/22/2017  3:03 AM   Dressing Intervention Dressing reinforced 5/22/2017  3:03 AM   Dressing Change Due 05/25/17 5/22/2017  3:03 AM   Reason Not Rotated Not due 5/22/2017  3:03 AM   Number of days: 0            Peripheral IV - Single Lumen 05/21/17 0000 Right Forearm (Active)   Site Assessment Clean;Dry;Intact;No redness;No swelling 5/22/2017  3:03 AM   Line Status Flushed;Infusing 5/22/2017  3:03 AM   Dressing Status Clean;Dry;Intact 5/22/2017  3:03 AM   Dressing Intervention Dressing reinforced 5/22/2017  3:03 AM   Dressing Change Due 05/25/17 5/22/2017  3:03 AM   Reason Not Rotated Not due 5/22/2017  3:03 AM   Number of days: 1            Peripheral IV - Single Lumen 05/21/17 0000 Right Forearm (Active)   Site Assessment Clean;Dry;Intact 5/22/2017  3:03 AM   Line Status Flushed;Saline locked 5/22/2017  3:03 AM   Dressing Status Clean;Dry;Intact 5/22/2017  3:03 AM   Dressing Intervention Dressing reinforced 5/22/2017  3:03 AM   Dressing Change Due 05/26/17 5/22/2017  3:03 AM   Reason Not Rotated Not due 5/22/2017  3:03 AM   Number of days: 1            Peripheral IV - Single Lumen 05/21/17 0000 Left Forearm (Active)   Site Assessment Clean;Dry;Intact;No redness;No swelling 5/22/2017  3:03 AM   Line Status Flushed;Saline locked 5/22/2017  3:03 AM   Dressing Status Clean;Dry;Intact 5/22/2017  3:03 AM   Dressing Intervention Dressing reinforced 5/22/2017  3:03 AM   Dressing Change Due 05/25/17 5/22/2017  3:03 AM   Reason Not Rotated Not due 5/22/2017  3:03 AM   Number of days: 1       Wound/Incision:    Laboratory:  CBC:   Recent Labs  Lab 05/24/17  0250  05/24/17  1717    WBC 7.87  --   --    RBC 3.06*  --   --    HGB 7.1*  < > 8.3*   HCT 23.4*  < > 27.9*     --   --    MCV 77*  --   --    MCH 23.2*  --   --    MCHC 30.3*  --   --    < > = values in this interval not displayed.  BMP:     Recent Labs  Lab 05/24/17  0250   GLU 97      K 4.1   *   CO2 16*   BUN 17   CREATININE 1.0   CALCIUM 8.1*   MG 2.1     CMP:   Recent Labs  Lab 05/21/17  1454  05/24/17  0250   *  < > 97   CALCIUM 8.7  < > 8.1*   ALBUMIN 3.9  --   --    PROT 6.7  --   --      < > 142   K 3.7  < > 4.1   CO2 20*  < > 16*     < > 116*   BUN 15  < > 17   CREATININE 1.0  < > 1.0   ALKPHOS 69  --   --    ALT 26  --   --    AST 42*  --   --    BILITOT 0.9  --   --    < > = values in this interval not displayed.  LFTs:     Recent Labs  Lab 05/21/17  1454   ALT 26   AST 42*   ALKPHOS 69   BILITOT 0.9   PROT 6.7   ALBUMIN 3.9     Coagulation:   Recent Labs  Lab 05/21/17  2236  05/24/17  0250   INR 1.1  < > 1.0   APTT 21.5  --   --    < > = values in this interval not displayed.  Cardiac markers:     Recent Labs  Lab 05/22/17  1312   TROPONINI 1.625*     ABGs:     Recent Labs  Lab 05/21/17  2144   PH 7.436   PCO2 27.2*   PO2 75*   HCO3 18.3*   POCSATURATED 96   BE -6     Microbiology Results (last 7 days)     ** No results found for the last 168 hours. **        Specimen (12h ago through future)    None          Recent Labs  Lab 05/21/17  1826   COLORU Yellow   SPECGRAV >1.030*   PHUR 5.0   PROTEINUA Negative   BACTERIA Few*   NITRITE Negative   LEUKOCYTESUR Negative   UROBILINOGEN Negative       Diagnostic Results:  CT head stable. Clearing of contrast.     ASSESSMENT/PLAN:     Assessment: 83 y/o F R MCA stroke s/p IV tPa, attempted thrombectomy TICI 1 PSD#3    Neuro stable.   q 1 hour neuro checks  HOB >30degrees, neck midline.   Medical management per NCC/Stroke  Will follow closely  Pt poor surgical candidate, hemicrani not indicated at this time.

## 2017-05-25 NOTE — PT/OT/SLP PROGRESS
Physical Therapy      Priya Knapp  MRN: [id number]    Patient not seen today secondary to Other (Comment) (Attempted to see pt at 1330, RN present to advance NG tube. pt's family with multiple questions for MD's. PT unable to return). Will follow-up as POC allows.    Vee Caldwell PT, DPT  5/25/2017  Pager: [phone]

## 2017-05-25 NOTE — ASSESSMENT & PLAN NOTE
Patient was at an outside facility when she developed sudden onset of left weakness and right gaze preference concerning for an ischemic stroke. She received tPA (5/21) and was transferred here for intervention. She was taken to IR for thrombectomy of right ICA/M1 occlusion, but was unsuccessful with TICI 1. Neuroimaging shows a large territory right MCA stroke, with some mideline shift. Family relayed the patient's wishes of DNR/DNI, and are aware that the patient's condition could get worse. Etiology unclear at this time - echo unremarkable with EF 74%.    After discussion with family, the decision was made to transition to comfort care with hospice. SW/CM were notified, and Palliative care is consulted for help with end of life discussions.     -- PRNN lorazepam, haloperidol, morphine

## 2017-05-25 NOTE — PLAN OF CARE
Problem: Patient Care Overview  Goal: Plan of Care Review  Outcome: Ongoing (interventions implemented as appropriate)  No acute events throughout night, NS @ 75, VS and assessment per flow sheet, patient progressing towards goals as tolerated, plan of care reviewed with Priya Knapp and family, all concerns addressed, will continue to monitor.

## 2017-05-25 NOTE — PLAN OF CARE
Problem: Patient Care Overview  Goal: Plan of Care Review  Outcome: Ongoing (interventions implemented as appropriate)  POC reviewed with pt and family at 1400. Pt's family verbalized understanding. Questions and concerns about comfort addressed. No acute events today. Plan is to keep pt comfortable and initiate palliative care measures. Pt progressing toward goals. Will continue to monitor. See flowsheets for full assessment and VS info.

## 2017-05-26 VITALS
WEIGHT: 156.5 LBS | OXYGEN SATURATION: 99 % | SYSTOLIC BLOOD PRESSURE: 143 MMHG | HEART RATE: 96 BPM | HEIGHT: 67 IN | BODY MASS INDEX: 24.56 KG/M2 | DIASTOLIC BLOOD PRESSURE: 63 MMHG | RESPIRATION RATE: 12 BRPM | TEMPERATURE: 99 F

## 2017-05-26 PROBLEM — R52 PAIN: Status: ACTIVE | Noted: 2017-05-26

## 2017-05-26 PROBLEM — F41.9 ANXIETY: Status: ACTIVE | Noted: 2017-05-26

## 2017-05-26 LAB — POCT GLUCOSE: 93 MG/DL (ref 70–110)

## 2017-05-26 PROCEDURE — 99233 SBSQ HOSP IP/OBS HIGH 50: CPT | Mod: ,,, | Performed by: PSYCHIATRY & NEUROLOGY

## 2017-05-26 PROCEDURE — G8998 SWALLOW D/C STATUS: HCPCS | Mod: CN

## 2017-05-26 PROCEDURE — 99233 SBSQ HOSP IP/OBS HIGH 50: CPT | Mod: S$PBB,,, | Performed by: CLINICAL NURSE SPECIALIST

## 2017-05-26 PROCEDURE — 27000221 HC OXYGEN, UP TO 24 HOURS

## 2017-05-26 PROCEDURE — 63600175 PHARM REV CODE 636 W HCPCS: Performed by: PSYCHIATRY & NEUROLOGY

## 2017-05-26 PROCEDURE — G8997 SWALLOW GOAL STATUS: HCPCS | Mod: CK

## 2017-05-26 RX ADMIN — MORPHINE SULFATE 2 MG: 2 INJECTION, SOLUTION INTRAMUSCULAR; INTRAVENOUS at 04:05

## 2017-05-26 RX ADMIN — LORAZEPAM 1 MG: 2 INJECTION INTRAMUSCULAR; INTRAVENOUS at 05:05

## 2017-05-26 RX ADMIN — MORPHINE SULFATE 2 MG: 2 INJECTION, SOLUTION INTRAMUSCULAR; INTRAVENOUS at 05:05

## 2017-05-26 RX ADMIN — MORPHINE SULFATE 2 MG: 2 INJECTION, SOLUTION INTRAMUSCULAR; INTRAVENOUS at 03:05

## 2017-05-26 RX ADMIN — LORAZEPAM 1 MG: 2 INJECTION INTRAMUSCULAR; INTRAVENOUS at 01:05

## 2017-05-26 RX ADMIN — LORAZEPAM 1 MG: 2 INJECTION INTRAMUSCULAR; INTRAVENOUS at 04:05

## 2017-05-26 NOTE — CONSULTS
"Consult Note  Palliative Care      Consult Requested By: Gautam Merritt MD  Reason for Consult: end of life hospice   Chart reviewed and patient discussed with Dr. Moya neuro critical care resident       ASSESSMENT/PLAN:         Impression: Ms. Knapp is a 83 yo lady with Embolic stroke involving right middle cerebral artery with little improvement after tPA and thrombectomy.  She is lying in bed, sleeping soundly,  withdraws to pain.  Unable to follow simple commands.  Appears to be free from pain or discomfort, no moaning facial grimacing, or shortness of breath. No acute distress     Goals of Care:   Patient currently has DNR orders   Sons Josee ([phone]) and Josh [phone]) are the medical decision makers.     Met with family (sons Josh and Joese and grandson Cuong, son Rafl unable to be here)  this morning.  During this conversation family states complete understanding of current clinical condition. Family states from conversations with their mother her wishes would be to made a comfortable as possible and would not wish to be on life support.  As she was a "go getter" who enjoyed life to the fullest.The family is in consensus and wishes to continue with comfort care and transition to hospice care. Meets criteria for home hospice.  Son states this would be ideal but they do not have the family support needed. Hospice education completed and family has full understanding.  They  have chosen [facility] in [place] and [facility] in [place].  Family states they would like the transition to occur today if possible.     Symptom Management:   Pain/dyspnea :   Recommend continuing morphine 2 mg IVP every  15 mins as needed for pain/dypnea  Anxiety:   Recommend continuing lorazepam 1 mg IVP every hr as needed for anxiety  Agitation    Recommend continuing  Haloperidol 1 mg IVP every 4 hrs as needed for agitation and comfort    "   Plan/Recommendations:  1.  Rapport established with palliative care.  Hospice education completed  2.  Consult with /case management for hospice referral - Kary and Tuyet are aware of family choices  3.  See symptom management recommendations above  4. Emotional support    Primary team notified of above recommendations.  Thank you for opportunity to participate in  Mrs. Knapp's  care.      Signature:  Ceci Andujar, APRN, ACNS-BC, OCN     > 50% of  70  min visit spent in chart review, face to face discussion of goals of care,  symptom assessment, coordination of care and emotional support.      SUBJECTIVE:     History of Present Illness: Mrs. Knapp is a 83 yo lady with PMH PUD, hiatal hernia and polymyalgia rheumatica. She  transferred from [facility] for management of CVA. She had been receiving treatment for a GI bleed. Her PMH includes  On 5/21 she developed  sudden onset of left sided weakness and right gaze preference. CT at [facility] inidcated possible R ICA occlusion. She received tPA and was transferred for possible thrombectomy. He symptoms did not improve and the thrombectomy was done.  On 5/22 repeat CT of head showed a large infarct.        Past Medical History:   Diagnosis Date    Hiatal hernia     Polymyalgia rheumatica      Past Surgical History:   Procedure Laterality Date    BACK SURGERY      CARPAL TUNNEL RELEASE Bilateral     HYSTERECTOMY      TOTAL KNEE ARTHROPLASTY Bilateral      Family History   Problem Relation Age of Onset    Family history unknown: Yes     Review of patient's allergies indicates:   Allergen Reactions    Codeine     Darvon [propoxyphene]     Macrodantin [nitrofurantoin macrocrystal]     Penicillins     Pneumococcal 23-erika ps vaccine     Talwin [pentazocine lactate]        Medications:  Continuous Infusions:    Scheduled:    PRN Meds: haloperidol lactate, lorazepam, morphine, ondansetron    24h Oral Morphine Equivalents  (OME): 24    Bowel Management Plan (BMP): Yes (X )  NO  (  )    OBJECTIVE:   Symptom Assessment (ESAS 0-10 scale)     ESAS 0 1 2 3 4 5 6 7 8 9 10   Pain              Dyspnea              Anxiety              Nausea              Depression               Anorexia              Fatigue              Insomnia              Restlessness               Agitation              Constipation     __ --  ___+   Diarrhea           __ --  ___+     Comments: Unable to assess review of symptoms: pain and dyspnea.  No moaning, grimacing, shortness of breath room air pulse ox 99%      Performance Status: PPS Score (20 )       Physical Exam:  Vitals: reviewed  General: Afebrile, sedated, comfortable, no acute distress.   Pulmonary: Non labored,clear to auscultation anteriorly.   Cardiac: normal S1 & S2 w/o rubs/murmurs/gallops.   Abdominal: Non-tender, non-distended.Bowel sounds present x 4. No appreciable hepatosplenomegaly. No guarding or rebound tenderness.   Extremities: does not move all extremities x 4. No peripheral edema. 2+ pulses.  Skin: No jaundice, rashes, or visible lesions.  Neurological: withdraws to pain Alert and oriented x 4. No focal neuro deficits.   Psych/Mental Status: sedated  CAM / Delirium _unable to assess    FAST Stage for Dementia:      Labs: reviewed  CBC:   WBC   Date Value Ref Range Status   05/25/2017 6.06 3.90 - 12.70 K/uL Final     Hemoglobin   Date Value Ref Range Status   05/25/2017 8.2 (L) 12.0 - 16.0 g/dL Final     Hematocrit   Date Value Ref Range Status   05/25/2017 27.2 (L) 37.0 - 48.5 % Final     MCV   Date Value Ref Range Status   05/25/2017 78 (L) 82 - 98 fL Final     Platelets   Date Value Ref Range Status   05/25/2017 155 150 - 350 K/uL Final       BMP: No results for input(s): GLU, NA, K, CL, CO2, BUN, CREATININE, CALCIUM, MG in the last 24 hours.    LFT:   Lab Results   Component Value Date    AST 76 (H) 05/25/2017    ALKPHOS 52 (L) 05/25/2017    BILITOT 1.0 05/25/2017       Albumin:    Albumin   Date Value Ref Range Status   2017 2.8 (L) 3.5 - 5.2 g/dL Final     Protein:   Total Protein   Date Value Ref Range Status   2017 5.3 (L) 6.0 - 8.4 g/dL Final       LACTIC ACID: No results found for: LACTATE    Radiology: reviewed        Legal/Advanced Directives: not on file   Living Will: not on file   Resuscitate Status: DNR  Decision-Making Capacity:   Medical Power of : Beto Moreno [phone] reports being POA    Psychosocial/Cultural:  for 50 yrs,   in , three sons,  6 grandchildren and two great grandchildren    Spiritual:     F- Ana and Belief: Pentecostalism    I - Importance  .  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

## 2017-05-26 NOTE — PROGRESS NOTES
[facility]  Neurocritical Care  Progress Note    Admit Date: 5/21/2017  Service Date: 05/26/2017  Length of Stay: 5    Subjective:     Chief Complaint: Embolic stroke involving right middle cerebral artery    History of Present Illness: Mrs. Knapp is an 82 year old woman with a history of PUD, hiatal hernia and polymyalgia rheumatica who was transferred from [facility] for management of stroke. She was admitted there for a GI bleed, however a scope showed no evidence of active bleeding. Around 8:20am on 5/21 she developed sudden onset of left sided weakness and right gaze preference. CTA at the outside facility showed a possible R ICA occlusion. She received tPA at 10:05am and was transferred here for possible thrombectomy.    Symptoms have not improved significantly since onset. CT head on arrival showed a small area of hypoattenuation. The decision was made to take her to IR for thrombectomy.     Hospital Course: 5/22 CT head with large infarct.   5/23 H/H stable. CT abdomen ordered to evaluate for other source of bleed  5/24 Unable to place NG tube with fluoro.   5/25 Plan to withdraw care and transition to hospice.   5/26 Family has chosen a Hospice facility. D/c today    Interval History:  No events overnight. Family is at bedside and ready to transition to comfort care with hospice. No issues.    Review of Systems   Respiratory: Negative for shortness of breath.    Cardiovascular: Negative for chest pain.   Neurological: Positive for facial asymmetry and weakness.     Objective:     Vitals:  Temp: 99 °F (37.2 °C) (05/26/17 0303)  Pulse: 103 (05/26/17 1258)  Resp: 12 (05/26/17 1258)  BP: 128/79 (05/26/17 0600)  SpO2: 96 % (05/26/17 1258)    Temp:  [99 °F (37.2 °C)-99.5 °F (37.5 °C)] 99 °F (37.2 °C)  Pulse:  [] 103  Resp:  [10-40] 12  SpO2:  [96 %-100 %] 96 %  BP: (128-180)/(61-81) 128/79  Arterial Line BP: (101-195)/(60-80) 130/62        05/25 0701 - 05/26 0700  In: 250    Out: 370 [Urine:370]      Physical Exam  GA: Comfortable. No acute distress  HEENT: No scleral icterus or JVD.   Pulmonary: No respiratory distress  Skin: Multiple ecchymosis      Neuro:  --GCS: E3V4M6  --Mental Status: Sleeping comfortably.  --brainstem: deferred  --Motor: LUE with limited movement. Moves bilateral lower extremities (R>L) and RUE spontaneously.   --Gait: deferred        Medications:  Continuous   Scheduled   PRN    haloperidol lactate 1 mg Q4H PRN   lorazepam 1 mg Q1H PRN   morphine 2 mg Q15 Min PRN   ondansetron 4 mg Q12H PRN     Today I personally reviewed pertinent medications, imaging, lab results,     Assessment/Plan:     Neuro   * Embolic stroke involving right middle cerebral artery    Patient was at an outside facility when she developed sudden onset of left weakness and right gaze preference concerning for an ischemic stroke. She received tPA (5/21) and was transferred here for intervention. She was taken to IR for thrombectomy of right ICA/M1 occlusion, but was unsuccessful with TICI 1. Neuroimaging shows a large territory right MCA stroke, with some mideline shift. Family relayed the patient's wishes of DNR/DNI, and are aware that the patient's condition could get worse. Etiology unclear at this time - echo unremarkable with EF 74%.    After discussion with family, the decision was made to transition to comfort care with hospice. SW/CM were notified, and Palliative care is consulted for help with end of life discussions.     -- PRN lorazepam, haloperidol, morphine  -- Discharge to hospice today              Prophylaxis:  Venous Thromboembolism: mechanical  Stress Ulcer: NA  Ventilator Pneumonia: not applicable     Activity Orders          None        DNR    Regina Howard MD  Neurocritical Care  [facility]

## 2017-05-26 NOTE — PLAN OF CARE
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    Allergies:  Review of patient's allergies indicates:   Allergen Reactions    Codeine     Darvon [propoxyphene]     Macrodantin [nitrofurantoin macrocrystal]     Penicillins     Pneumococcal 23-erika ps vaccine     Talwin [pentazocine lactate]        Diet: NPO - can give pleasure feeds if family wishes    Activities: As tolerated    Nursing: Per Hospice Routine    Future Orders:  Hospice Medical Director may dictate new orders for comfortable care measures & sign death certificate.    Medications:         Comfort Care Medications Only            _________________________________  Regina Howard MD  05/26/2017

## 2017-05-26 NOTE — PLAN OF CARE
ALISHA spoke with [facility] in [place]. They reported they can set up the bed at [facility] and will work to get the assessment completed to have her moved today.     ALISHA sent H&P, facesheet, labs, radiology, hospice orders, and progress notes via [employer] to [facility].     ALISHA spoke with [facility] admit regarding the referral. They only received one page. ALISHA resent the docs via [employer] and asked they call in one hour if it has not been received.     12:45pm: ALISHA met with the Pt family at bedside. [facility] clinical liaison with family completing paperwork. Introduced self and reported that when everything is set up with [facility], SW will set up transportation.    2:57 PM: SW contacted [facility]. They reported they have secured a bed and are ready for Pt to leave for the facility.     There are two numbers to report to: [facility] Room [phone], and the Hospice RN: [facility] cell: [phone].     ALISHA contacted RN to report Pt clearance to go. ALISHA set up [facility] transportation and placed envelope outside of Pt room. ALISHA advised family in the room of Pt ETA to leave.    Kary Krishnamurthy, SW  Neurocritical Care   [facility]  [id number]

## 2017-05-26 NOTE — NURSING TRANSFER
Nursing Transfer Note      5/26/2017     Transfer To: \[facility]\"" [facility] from [id number]     Transfer via stretcher and [facility]    Transfer with cardiac monitoring    Transported by [facility]    Medicines sent: none    Chart send with patient: Yes    Notified: son    1mg Ativan given before discharge

## 2017-05-26 NOTE — PT/OT/SLP PROGRESS
Speech Language Pathology Note  Discharge    Pt DC'ed from SLP services 2/2 transfer to comfort care. Confirmed with MIMI Quintero. Thank you.    Maggie Lindsay M.A. CCC-SLP  Speech Language Pathologist  [phone]  5/26/2017

## 2017-05-26 NOTE — PROGRESS NOTES
ICU Attending Note  Neurocritical Care    She is comfortable today. Family is waiting at bedside.    -morphine, lorazepam prn  -remove supplemental oxygen  -hospice

## 2017-05-26 NOTE — SUBJECTIVE & OBJECTIVE
Interval History:  No events overnight. Family is at bedside and ready to transition to comfort care with hospice. No issues.    Review of Systems   Respiratory: Negative for shortness of breath.    Cardiovascular: Negative for chest pain.   Neurological: Positive for facial asymmetry and weakness.     Objective:     Vitals:  Temp: 99 °F (37.2 °C) (05/26/17 0303)  Pulse: 103 (05/26/17 1258)  Resp: 12 (05/26/17 1258)  BP: 128/79 (05/26/17 0600)  SpO2: 96 % (05/26/17 1258)    Temp:  [99 °F (37.2 °C)-99.5 °F (37.5 °C)] 99 °F (37.2 °C)  Pulse:  [] 103  Resp:  [10-40] 12  SpO2:  [96 %-100 %] 96 %  BP: (128-180)/(61-81) 128/79  Arterial Line BP: (101-195)/(60-80) 130/62        05/25 0701 - 05/26 0700  In: 250   Out: 370 [Urine:370]    Physical Exam    GA: Comfortable. No acute distress  HEENT: No scleral icterus or JVD.   Pulmonary: Clear to auscultation A/L. No wheezing, crackles, or rhonchi.  Cardiac: RRR S1 & S2 w systolic murmur.   Abdominal: Bowel sounds present x 4. No appreciable hepatosplenomegaly.  Skin: No jaundice, rashes, or visible lesions.     Neuro:  --GCS: E3V4M6  --Mental Status: Sleeping comfortably.  --Pupils 3mm, PERRL.   --brainstem: deferred  --Motor: LUE with limited movement. Moves bilateral lower extremities (R>L) and RUE spontaneously.   --Gait: deferred        [place] Coma Scale    Medications:  Continuous   Scheduled   PRN    haloperidol lactate 1 mg Q4H PRN   lorazepam 1 mg Q1H PRN   morphine 2 mg Q15 Min PRN   ondansetron 4 mg Q12H PRN     Today I personally reviewed pertinent medications, imaging, lab results,

## 2017-05-26 NOTE — PLAN OF CARE
Problem: Patient Care Overview  Goal: Plan of Care Review  Outcome: Ongoing (interventions implemented as appropriate)  POC reviewed with pt and family at 2000. Pt is on comfort care. Family verbalized understanding. Questions and concerns addressed. No acute events overnight. Morphine 2mg given for pain. Will continue to monitor. See flowsheets for full assessment and VS info

## 2017-05-26 NOTE — ASSESSMENT & PLAN NOTE
Patient was at an outside facility when she developed sudden onset of left weakness and right gaze preference concerning for an ischemic stroke. She received tPA (5/21) and was transferred here for intervention. She was taken to IR for thrombectomy of right ICA/M1 occlusion, but was unsuccessful with TICI 1. Neuroimaging shows a large territory right MCA stroke, with some mideline shift. Family relayed the patient's wishes of DNR/DNI, and are aware that the patient's condition could get worse. Etiology unclear at this time - echo unremarkable with EF 74%.    After discussion with family, the decision was made to transition to comfort care with hospice. SW/CM were notified, and Palliative care is consulted for help with end of life discussions.     -- PRN lorazepam, haloperidol, morphine  -- Discharge to hospice today

## 2017-05-26 NOTE — PT/OT/SLP DISCHARGE
Physical Therapy Discharge Summary    Priya Knapp  MRN: [id number]   Embolic stroke involving right middle cerebral artery   Patient Discharged from acute Physical Therapy on 17.  Please refer to prior PT noted date on 17 for functional status.     Assessment:   Patient has not met goals.  GOALS:    Physical Therapy Goals        Problem: Physical Therapy Goal    Goal Priority Disciplines Outcome Goal Variances Interventions   Physical Therapy Goal     PT/OT, PT Ongoing (interventions implemented as appropriate)     Description:  Goals to be met by: 2017     Patient will increase functional independence with mobility by performin. Supine to sit with Maximum Assistance  2. Sit to supine with Maximum Assistance  3. Sit to stand transfer with Maximum Assistance  4. Bed to chair transfer with Maximum Assistance using AD or NO AD  5. Gait x10 feet with Total Assistance using AD or No AD  6. Sitting at edge of bed x10 minutes with Maximum Assistance  7. Stand for x2 minutes with Maximum Assistance using AD or NO AD  8. Lower extremity exercise program x15 reps per handout, with assistance as needed                  Reasons for Discontinuation of Therapy Services  Patient/Family declines to continue care.  Family has decided to transition to comfort care.    Plan:  Patient Discharged to: Palliative Care/Hospice.    Vee Caldwell PT, DPT  2017  Pager: [phone]

## 2017-05-26 NOTE — CONSULTS
Palliative Care Acknowledgement of Consult - .date 5/26/17 8 AM     Consult received.  Will touch base with team prior to seeing patient.  Full consult to follow.    Thank you for the opportunity to participate in Mrs. Knapp's care.     MARLENA Swan, ACNS-BC, OCN   Palliative Medicine [facility] link [phone]